# Patient Record
Sex: FEMALE | Race: WHITE | NOT HISPANIC OR LATINO | Employment: FULL TIME | URBAN - METROPOLITAN AREA
[De-identification: names, ages, dates, MRNs, and addresses within clinical notes are randomized per-mention and may not be internally consistent; named-entity substitution may affect disease eponyms.]

---

## 2019-12-08 ENCOUNTER — OFFICE VISIT (OUTPATIENT)
Dept: URGENT CARE | Facility: MEDICAL CENTER | Age: 49
End: 2019-12-08
Payer: COMMERCIAL

## 2019-12-08 VITALS
WEIGHT: 127 LBS | DIASTOLIC BLOOD PRESSURE: 78 MMHG | OXYGEN SATURATION: 100 % | SYSTOLIC BLOOD PRESSURE: 110 MMHG | BODY MASS INDEX: 22.5 KG/M2 | TEMPERATURE: 98.1 F | HEART RATE: 48 BPM | RESPIRATION RATE: 18 BRPM | HEIGHT: 63 IN

## 2019-12-08 DIAGNOSIS — R30.0 DYSURIA: Primary | ICD-10-CM

## 2019-12-08 LAB
SL AMB  POCT GLUCOSE, UA: NORMAL
SL AMB LEUKOCYTE ESTERASE,UA: NORMAL
SL AMB POCT BILIRUBIN,UA: NORMAL
SL AMB POCT BLOOD,UA: NORMAL
SL AMB POCT CLARITY,UA: CLEAR
SL AMB POCT COLOR,UA: YELLOW
SL AMB POCT KETONES,UA: NORMAL
SL AMB POCT NITRITE,UA: NORMAL
SL AMB POCT PH,UA: 6.5
SL AMB POCT SPECIFIC GRAVITY,UA: 1.01
SL AMB POCT URINE PROTEIN: NORMAL
SL AMB POCT UROBILINOGEN: 0.2

## 2019-12-08 PROCEDURE — 81002 URINALYSIS NONAUTO W/O SCOPE: CPT | Performed by: PHYSICIAN ASSISTANT

## 2019-12-08 PROCEDURE — 87077 CULTURE AEROBIC IDENTIFY: CPT | Performed by: PHYSICIAN ASSISTANT

## 2019-12-08 PROCEDURE — 87086 URINE CULTURE/COLONY COUNT: CPT | Performed by: PHYSICIAN ASSISTANT

## 2019-12-08 PROCEDURE — 99203 OFFICE O/P NEW LOW 30 MIN: CPT | Performed by: PHYSICIAN ASSISTANT

## 2019-12-08 RX ORDER — NITROFURANTOIN 25; 75 MG/1; MG/1
100 CAPSULE ORAL 2 TIMES DAILY
Qty: 14 CAPSULE | Refills: 0 | Status: SHIPPED | OUTPATIENT
Start: 2019-12-08 | End: 2019-12-15

## 2019-12-09 NOTE — PROGRESS NOTES
[facility]        NAME: Kiara Mustafa is a 51 y o  female  : 1970    MRN: [id number]  DATE: 2019  TIME: 7:56 PM    Assessment and Plan   Dysuria [R30 0]  1  Dysuria  POCT urine dip    Urine culture    nitrofurantoin (MACROBID) 100 mg capsule         Patient Instructions     1  Take Macrobid 100 mg twice daily for 7 days  2  Increase fluids  3  Motrin as needed for discomfort  4  Follow up with PCP in 3-5 days if symptoms persist       Chief Complaint     Chief Complaint   Patient presents with    Possible UTI     Dysuria and abdominal pressure beginning at 3:00pm today  History of Present Illness       Martha Adams is a 27-year-old female presents with a 1 day history of acute onset pain and burning with urination  Patient denies any abdominal or back pain, she has noticed no visible blood in her urine  Review of Systems   Review of Systems   Constitutional: Negative  Gastrointestinal: Negative  Genitourinary: Positive for dysuria, frequency and urgency  Negative for hematuria  Current Medications       Current Outpatient Medications:     nitrofurantoin (MACROBID) 100 mg capsule, Take 1 capsule (100 mg total) by mouth 2 (two) times a day for 7 days, Disp: 14 capsule, Rfl: 0    Current Allergies     Allergies as of 2019    (No Known Allergies)            The following portions of the patient's history were reviewed and updated as appropriate: allergies, current medications, past family history, past medical history, past social history, past surgical history and problem list      History reviewed  No pertinent past medical history  Past Surgical History:   Procedure Laterality Date    CHOLECYSTECTOMY      GASTRIC BYPASS         Family History   Problem Relation Age of Onset    Heart disease Mother     COPD Mother          Medications have been verified          Objective   /78   Pulse (!) 48   Temp 98 1 °F (36 7 °C) (Temporal)   Resp 18 Ht 5' 3" (1 6 m)   Wt 57 6 kg (127 lb)   SpO2 100%   BMI 22 50 kg/m²        Physical Exam     Physical Exam   Constitutional: She appears well-developed and well-nourished  No distress  Cardiovascular: Normal rate, regular rhythm and normal heart sounds  No murmur heard  Pulmonary/Chest: Effort normal and breath sounds normal    Abdominal: Soft  Normal appearance and bowel sounds are normal  There is tenderness in the suprapubic area  There is no CVA tenderness

## 2019-12-09 NOTE — PATIENT INSTRUCTIONS
1  Take Macrobid 100 mg twice daily for 7 days  2  Increase fluids  3  Motrin as needed for discomfort  4   Follow up with PCP in 3-5 days if symptoms persist

## 2019-12-10 LAB — BACTERIA UR CULT: ABNORMAL

## 2020-12-01 LAB
EXTERNAL HIV CONFIRMATION: NORMAL
EXTERNAL HIV SCREEN: NORMAL

## 2021-03-26 DIAGNOSIS — Z23 ENCOUNTER FOR IMMUNIZATION: ICD-10-CM

## 2022-02-02 ENCOUNTER — OFFICE VISIT (OUTPATIENT)
Dept: FAMILY MEDICINE CLINIC | Facility: CLINIC | Age: 52
End: 2022-02-02
Payer: COMMERCIAL

## 2022-02-02 VITALS
HEIGHT: 64 IN | DIASTOLIC BLOOD PRESSURE: 82 MMHG | WEIGHT: 141 LBS | SYSTOLIC BLOOD PRESSURE: 120 MMHG | BODY MASS INDEX: 24.07 KG/M2 | TEMPERATURE: 98.2 F | HEART RATE: 69 BPM | OXYGEN SATURATION: 99 % | RESPIRATION RATE: 16 BRPM

## 2022-02-02 DIAGNOSIS — Z90.3 S/P PARTIAL GASTRECTOMY: ICD-10-CM

## 2022-02-02 DIAGNOSIS — F41.9 ANXIETY: ICD-10-CM

## 2022-02-02 DIAGNOSIS — K60.2 ANAL FISSURE: ICD-10-CM

## 2022-02-02 DIAGNOSIS — F50.81 BINGE EATING DISORDER: Primary | ICD-10-CM

## 2022-02-02 DIAGNOSIS — Z23 NEED FOR VACCINATION: ICD-10-CM

## 2022-02-02 DIAGNOSIS — Z12.11 SCREENING FOR COLON CANCER: ICD-10-CM

## 2022-02-02 PROBLEM — D25.0 SUBMUCOUS LEIOMYOMA OF UTERUS: Status: ACTIVE | Noted: 2018-10-29

## 2022-02-02 PROBLEM — Z98.84 S/P BARIATRIC SURGERY: Status: ACTIVE | Noted: 2022-02-02

## 2022-02-02 PROBLEM — Z87.410 HISTORY OF CERVICAL DYSPLASIA: Status: ACTIVE | Noted: 2021-05-24

## 2022-02-02 PROCEDURE — 90715 TDAP VACCINE 7 YRS/> IM: CPT

## 2022-02-02 PROCEDURE — 90471 IMMUNIZATION ADMIN: CPT

## 2022-02-02 PROCEDURE — 3008F BODY MASS INDEX DOCD: CPT | Performed by: NURSE PRACTITIONER

## 2022-02-02 PROCEDURE — 99204 OFFICE O/P NEW MOD 45 MIN: CPT | Performed by: FAMILY MEDICINE

## 2022-02-02 RX ORDER — DIPHENOXYLATE HYDROCHLORIDE AND ATROPINE SULFATE 2.5; .025 MG/1; MG/1
1 TABLET ORAL DAILY
COMMUNITY

## 2022-02-02 RX ORDER — BUPROPION HYDROCHLORIDE 300 MG/1
300 TABLET ORAL DAILY
COMMUNITY
End: 2022-05-11 | Stop reason: SDUPTHER

## 2022-02-02 RX ORDER — LORAZEPAM 1 MG/1
1 TABLET ORAL EVERY 8 HOURS PRN
COMMUNITY
End: 2022-02-17 | Stop reason: SDUPTHER

## 2022-02-02 RX ORDER — MULTIVITAMIN WITH IRON
8000 TABLET ORAL DAILY
COMMUNITY

## 2022-02-03 ENCOUNTER — TELEPHONE (OUTPATIENT)
Dept: ADMINISTRATIVE | Facility: OTHER | Age: 52
End: 2022-02-03

## 2022-02-03 NOTE — TELEPHONE ENCOUNTER
Upon review of the In Basket request we were able to locate, review, and update the patient chart as requested for HIV, Mammogram and Pap Smear (HPV) aka Cervical Cancer Screening  Any additional questions or concerns should be emailed to the Practice Liaisons via [email or web address] email, please do not reply via In Basket      Thank you  Yamini Lipscomb MA

## 2022-02-03 NOTE — TELEPHONE ENCOUNTER
Upon review of the In Basket request we were able to locate, review, and update the patient chart as requested for Pap Smear (HPV) aka Cervical Cancer Screening  Any additional questions or concerns should be emailed to the Practice Liaisons via [email or web address] email, please do not reply via In Basket      Thank you  Sammy Bolivar MA

## 2022-02-03 NOTE — TELEPHONE ENCOUNTER
Upon review of the In Basket request we were able to locate, review, and update the patient chart as requested for HIV  Any additional questions or concerns should be emailed to the Practice Liaisons via [email or web address] email, please do not reply via In Basket      Thank you  Deepti Rodriguez MA

## 2022-02-03 NOTE — TELEPHONE ENCOUNTER
----- Message from Marylee Shuck, MD sent at 2/2/2022  6:37 PM EST -----  02/02/22 6:37 PM    Hello, our patient Travis Singer has had Pap Smear (HPV) aka Cervical Cancer Screening completed/performed  Please assist in updating the patient chart by pulling the Care Everywhere (CE) document  The date of service is 5/24/21       Thank you,  Marylee Shuck, MD  [facility]

## 2022-02-03 NOTE — TELEPHONE ENCOUNTER
----- Message from Agapito Rodriguez MD sent at 2/2/2022  6:37 PM EST -----  02/02/22 6:37 PM    Hello, our patient Leland Jensen has had Mammogram completed/performed  Please assist in updating the patient chart by pulling the Care Everywhere (CE) document  The date of service is 1/18/22       Thank you,  Agapito Rodriguez MD  [facility]

## 2022-02-03 NOTE — PROGRESS NOTES
Assessment/Plan:       Diagnoses and all orders for this visit:    1  Binge eating disorder  Comments:  discussed therapy vs medication management  she would like to hold off of medication at this time  Orders:  -     Ambulatory Referral to [facility]; Future    2  S/P partial gastrectomy  Comments:  4 years ago    3  Anxiety  Comments:  has been on lexapro in the past which didn't help  states wellbutrin is not working either  takes ativan prn  Orders:  -     Ambulatory Referral to [facility]; Future    4  Screening for colon cancer  -     Ambulatory referral to Gastroenterology; Future    5  Need for vaccination  -     TDAP VACCINE GREATER THAN OR EQUAL TO 8YO IM    6  Anal fissure  Comments:  recommend OTC rectal hydrocortisone, topical lidocaine  stressed importance of hydration  continue stool softener  Subjective:      Patient ID: Yaniv Foster is a 49 y o  female  HPI   Bethany Titus is a 50 yo female with history of anxiety, binge eating disorder, s/p partial gastrectomy 4 years ago, anal fissure who presents today as a new patient to establish care  States that her anal fissures has been bothering her lately  Has tried OTC anusol  Does not drink enough water  Has history of binge eating disorder  In the past was on lexapro and strattera which did not help  Was then put on wellbutrin which she is on now, but states this does not help either  Would like to wean off of it  The following portions of the patient's history were reviewed and updated as appropriate: allergies, current medications, past family history, past medical history, past social history, past surgical history and problem list     Review of Systems   Constitutional: Negative  HENT: Negative  Eyes: Negative  Respiratory: Negative  Cardiovascular: Negative  Gastrointestinal: Negative  Endocrine: Negative  Genitourinary: Negative  Musculoskeletal: Negative  Skin: Negative  Allergic/Immunologic: Negative  Neurological: Negative  Hematological: Negative  Psychiatric/Behavioral: Negative  Objective:      /82   Pulse 69   Temp 98 2 °F (36 8 °C)   Resp 16   Ht 5' 3 75" (1 619 m)   Wt 64 kg (141 lb)   SpO2 99%   BMI 24 39 kg/m²          Physical Exam  Constitutional:       General: She is not in acute distress  Appearance: She is well-developed  She is not diaphoretic  HENT:      Head: Normocephalic and atraumatic  Cardiovascular:      Rate and Rhythm: Normal rate and regular rhythm  Heart sounds: Normal heart sounds  No murmur heard  No friction rub  No gallop  Pulmonary:      Effort: Pulmonary effort is normal  No respiratory distress  Breath sounds: Normal breath sounds  No wheezing or rales  Chest:      Chest wall: No tenderness  Musculoskeletal:         General: No deformity  Normal range of motion  Cervical back: Normal range of motion and neck supple  Skin:     General: Skin is warm and dry  Neurological:      Mental Status: She is alert and oriented to person, place, and time  Psychiatric:         Behavior: Behavior normal          Thought Content:  Thought content normal          Judgment: Judgment normal

## 2022-02-03 NOTE — TELEPHONE ENCOUNTER
Upon review of the In Basket request we were able to locate, review, and update the patient chart as requested for Mammogram     Any additional questions or concerns should be emailed to the Practice Liaisons via [email or web address] email, please do not reply via In Basket      Thank you  Maine Robertson MA

## 2022-02-16 ENCOUNTER — OFFICE VISIT (OUTPATIENT)
Dept: OBGYN CLINIC | Facility: CLINIC | Age: 52
End: 2022-02-16
Payer: COMMERCIAL

## 2022-02-16 VITALS — SYSTOLIC BLOOD PRESSURE: 102 MMHG | DIASTOLIC BLOOD PRESSURE: 68 MMHG | BODY MASS INDEX: 23.7 KG/M2 | WEIGHT: 137 LBS

## 2022-02-16 DIAGNOSIS — N90.4 LICHEN SCLEROSUS OF VULVA: Primary | ICD-10-CM

## 2022-02-16 PROCEDURE — 1036F TOBACCO NON-USER: CPT | Performed by: NURSE PRACTITIONER

## 2022-02-16 PROCEDURE — 99202 OFFICE O/P NEW SF 15 MIN: CPT | Performed by: NURSE PRACTITIONER

## 2022-02-16 RX ORDER — CLOBETASOL PROPIONATE 0.5 MG/G
CREAM TOPICAL
Qty: 60 G | Refills: 3 | Status: SHIPPED | OUTPATIENT
Start: 2022-02-16 | End: 2023-02-15

## 2022-02-16 NOTE — PROGRESS NOTES
Assessment/Plan:    Lichen sclerosus of vulva  Reviewed lichen sclerosus in detail with patient, she is very familiar from her mother  Her mother has Lichen and she cares for her mother  Information given on Lichen  Rx for Temovate BID x 2 weeks then 1-3 x weekly PRN  If symptoms worsen or fail to improve with treatment recommend return to office for punch biopsy  RTO annual exam or sooner as needed       Diagnoses and all orders for this visit:    Lichen sclerosus of vulva  -     clobetasol (TEMOVATE) 0 05 % cream; Apply 5 g (1 application total) topically 2 (two) times a day for 14 days, THEN 5 g (1 application total) 3 (three) times a week  Subjective:      Patient ID: Pete Kirkpatrick is a 49 y o  female  Pt presents as a new partient for a problem visit  Presents with complaints of vaginal itching x 3-4 days  On outer portion of right labia manora, looked raw yesterday, but better today  She used her mother's cream (Temovate), her mother has lichen sclerosus and she was thinking it may be that  She also was informed by previous GYN is loss of pigmentation in her vulvar and vaginal area  She also has issues with anal fissure that is not healing appropriate  When she has intercourse she feels like there are little cuts or sores on anterior portion of vagina  Noticed it tends to occur with stress  At her previous GYN she was tested for HSV after presenting with similar symptoms  At that time she did have lesions as well  Was informed it was not HSV after culture and lab work  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  Anal itching she originally thought was a hemorrhoid, but has not resolved and no visual hemorrhoid seen  Seems better since using her mother's steroid cream    Denies any burning  Denies any abnormal dicharge, itching, or odor vaginally  Denies any STD testing  Hx of Ablation    Last annual exam and pap smear June 2021            The following portions of the patient's history were reviewed and updated as appropriate: allergies, current medications, past family history, past medical history, past social history, past surgical history and problem list     Review of Systems   Genitourinary:        Vaginal itching     All other systems reviewed and are negative  Objective:      /68 (BP Location: Right arm, Patient Position: Sitting, Cuff Size: Standard)   Wt 62 1 kg (137 lb)   BMI 23 70 kg/m²          Physical Exam  Vitals and nursing note reviewed  Constitutional:       Appearance: She is well-developed  HENT:      Head: Normocephalic and atraumatic  Neck:      Thyroid: No thyromegaly  Cardiovascular:      Rate and Rhythm: Normal rate and regular rhythm  Heart sounds: Normal heart sounds  Pulmonary:      Effort: Pulmonary effort is normal       Breath sounds: Normal breath sounds  Abdominal:      General: Bowel sounds are normal  There is no distension  Palpations: Abdomen is soft  There is no mass  Tenderness: There is no abdominal tenderness  There is no guarding or rebound  Genitourinary:     Labia:         Right: No rash, tenderness, lesion or injury  Left: No rash, tenderness, lesion or injury  Vagina: Normal       Cervix: No cervical motion tenderness, discharge or friability  Adnexa:         Right: No mass, tenderness or fullness  Left: No mass, tenderness or fullness  Musculoskeletal:         General: Normal range of motion  Cervical back: Normal range of motion and neck supple  Skin:     General: Skin is warm and dry  Neurological:      Mental Status: She is alert and oriented to person, place, and time  Psychiatric:         Behavior: Behavior normal          Thought Content:  Thought content normal          Judgment: Judgment normal

## 2022-02-17 DIAGNOSIS — F41.9 ANXIETY: Primary | ICD-10-CM

## 2022-02-17 RX ORDER — LORAZEPAM 1 MG/1
1 TABLET ORAL EVERY 8 HOURS PRN
Qty: 30 TABLET | Refills: 0 | Status: SHIPPED | OUTPATIENT
Start: 2022-02-17 | End: 2022-05-11 | Stop reason: SDUPTHER

## 2022-05-02 ENCOUNTER — TELEPHONE (OUTPATIENT)
Dept: FAMILY MEDICINE CLINIC | Facility: CLINIC | Age: 52
End: 2022-05-02

## 2022-05-02 DIAGNOSIS — F41.9 ANXIETY: ICD-10-CM

## 2022-05-11 DIAGNOSIS — F41.9 ANXIETY: ICD-10-CM

## 2022-05-11 RX ORDER — LORAZEPAM 1 MG/1
1 TABLET ORAL EVERY 8 HOURS PRN
Qty: 30 TABLET | Refills: 0 | Status: SHIPPED | OUTPATIENT
Start: 2022-05-11 | End: 2022-08-03

## 2022-05-11 RX ORDER — BUPROPION HYDROCHLORIDE 300 MG/1
300 TABLET ORAL DAILY
Qty: 90 TABLET | Refills: 1 | Status: SHIPPED | OUTPATIENT
Start: 2022-05-11

## 2022-06-08 ENCOUNTER — PREP FOR PROCEDURE (OUTPATIENT)
Dept: GASTROENTEROLOGY | Facility: CLINIC | Age: 52
End: 2022-06-08

## 2022-06-08 ENCOUNTER — TELEPHONE (OUTPATIENT)
Dept: GASTROENTEROLOGY | Facility: CLINIC | Age: 52
End: 2022-06-08

## 2022-06-08 DIAGNOSIS — Z12.11 SCREENING FOR COLON CANCER: Primary | ICD-10-CM

## 2022-06-08 NOTE — TELEPHONE ENCOUNTER
[facility] Assessment    Name: Fco Galdamez  YOB: 1970  Last Height: 5' 3 75" (1 619 m)  Last weight: 62 1 kg (137 lb)  BMI: 23 70 kg/m²  Procedure Colonoscopy  Diagnosis: Screening  Date of procedure: 6/27/22  Prep: Alan/Dul/Mag  Responsible : yes  Phone#: ?  Name completing form: Jaylene Portillo  Date form completed: 06/08/22      If the patient answers yes to any of these questions, schedule in a hospital  Are you pregnant: No  Do you rely on a wheelchair for mobility: No  Have you been diagnosed with End Stage Renal Disease (ESRD): No  Do you need oxygen during the day: No  Have you had a heart attack or stroke within the past three months: No  Have you had a seizure within the past three months: No  Have you ever been informed by anesthesia that you have a difficult airway: No  Additional Questions  Have you had any cardiac testing or are under the care of a Cardiologist (see cardiac list): No  Cardiac list:   Do you have an implanted cardiac defibrillator: No (Comment:  This patient should be scheduled in the hospital)    Have any bleeding problems, such as anemia or hemophilia (If patient has H&H result below 8, schedule in hospital   H&H must be within 30 days of procedure): No    Had an organ transplant within the past 3 months: No    Do you have any present infections: No  Do you get short of breath when walking a few blocks: No  Have you been diagnosed with diabetes: No  Comments (provide cardiac provider information if applicable):

## 2022-06-08 NOTE — TELEPHONE ENCOUNTER
Scheduled date of colonoscopy (as of today):6/27/22  Physician performing colonoscopy:Ro    Location of colonoscopy: [facility]    Bowel prep reviewed with patient:Alan/Nayely    Instructions reviewed with patient by:CHASTITY    Clearances: NA

## 2022-06-13 ENCOUNTER — ANNUAL EXAM (OUTPATIENT)
Dept: OBGYN CLINIC | Facility: CLINIC | Age: 52
End: 2022-06-13
Payer: COMMERCIAL

## 2022-06-13 VITALS — BODY MASS INDEX: 24.05 KG/M2 | SYSTOLIC BLOOD PRESSURE: 102 MMHG | DIASTOLIC BLOOD PRESSURE: 70 MMHG | WEIGHT: 139 LBS

## 2022-06-13 DIAGNOSIS — Z12.31 BREAST CANCER SCREENING BY MAMMOGRAM: ICD-10-CM

## 2022-06-13 DIAGNOSIS — Z01.419 ENCNTR FOR GYN EXAM (GENERAL) (ROUTINE) W/O ABN FINDINGS: Primary | ICD-10-CM

## 2022-06-13 PROCEDURE — 99396 PREV VISIT EST AGE 40-64: CPT | Performed by: NURSE PRACTITIONER

## 2022-06-13 PROCEDURE — 1036F TOBACCO NON-USER: CPT | Performed by: NURSE PRACTITIONER

## 2022-06-13 NOTE — PROGRESS NOTES
Assessment/Plan   Diagnoses and all orders for this visit:    Encntr for gyn exam (general) (routine) w/o abn findings  -     IGP, Aptima HPV    Breast cancer screening by mammogram  -     Mammo screening bilateral w 3d & cad; Future        Discussion    Reviewed normal exam today  Pap with HPV done today  Normal breast exam today  Monthly SBEs advised  Mammograms yearly  Rx for mammogram given  Encourage at least 1200 mg calcium citrate + 2000 IUs vitamin D3 divided through diet and supplement throughout the day  Encourage 30-40 min weight bearing exercise most days of week  Colon cancer screening with a colonoscopy is recommended starting at age 39 and reviewed benefits  I have reviewed the patient's risk factors for osteoporosis and ordered a dexa scan  Pt to check with insurance for coverage  All questions have been answered to her satisfaction  RTO for annual or sooner if needed    Subjective     Shalonda Gibbs is a 49 y o  female who presents for annual well woman exam    Last exam 5/24/2021 Pap unsatisfactory HPV non 16/18 + HPV 16 - HPV 18 - 6/15/2021 Pap normal   Pap guidelines reviewed with patient  Pt would like Pap today  Pt denies any abnormal vaginal discharge, itching, or odor  Pt in a mutually exclusive relationship () with a male partner and denies the need for STD testing today  Has been using temovate for Lichen, noticed a great improvement  Has noticed flares up when stressed, but cream helps significantly  Menstrual Cycle:  LMP: Hx of endometrial ablation 6 years ago  No menses since ablation  Some hot flashes, manageable  No hx of HRT  OB History     G 3 P 0  Contraception:   Practices monthly SBEs, no breast complaints today  Last Mammogram 1/18/2022  Colonoscopy Scheduled for next month  Denies any bowel or bladder issues  Pt follows with PCP for regular check-ups and blood work  Review of Systems   Genitourinary: Negative          The following portions of the patient's history were reviewed and updated as appropriate: allergies, current medications, past family history, past medical history, past social history, past surgical history and problem list     Past Medical History:   Diagnosis Date    Abnormal Pap smear of cervix     Anxiety     Binge eating disorder     on wellbutrin       Past Surgical History:   Procedure Laterality Date    CERVICAL BIOPSY  W/ LOOP ELECTRODE EXCISION      Age 26    CHOLECYSTECTOMY      GASTRIC BYPASS         Family History   Problem Relation Age of Onset    Heart disease Mother     COPD Mother     Other Mother        Social History     Socioeconomic History    Marital status: Single     Spouse name: Not on file    Number of children: Not on file    Years of education: Not on file    Highest education level: Not on file   Occupational History    Not on file   Tobacco Use    Smoking status: Never Smoker    Smokeless tobacco: Never Used   Vaping Use    Vaping Use: Never used   Substance and Sexual Activity    Alcohol use: Not Currently    Drug use: Never    Sexual activity: Yes     Partners: Male   Other Topics Concern    Not on file   Social History Narrative    Not on file     Social Determinants of Health     Financial Resource Strain: Not on file   Food Insecurity: Not on file   Transportation Needs: Not on file   Physical Activity: Not on file   Stress: Not on file   Social Connections: Not on file   Intimate Partner Violence: Not on file   Housing Stability: Not on file         Current Outpatient Medications:     B Complex Vitamins (VITAMIN B COMPLEX PO), Take by mouth every other day  , Disp: , Rfl:     buPROPion (WELLBUTRIN XL) 300 mg 24 hr tablet, Take 1 tablet (300 mg total) by mouth in the morning , Disp: 90 tablet, Rfl: 1    LORazepam (ATIVAN) 1 mg tablet, Take 1 tablet (1 mg total) by mouth every 8 (eight) hours as needed for anxiety, Disp: 30 tablet, Rfl: 0    multivitamin (THERAGRAN) TABS, Take 1 tablet by mouth daily, Disp: , Rfl:     vitamin A 2400 MCG (8000 UT) capsule, Take 8,000 Units by mouth daily, Disp: , Rfl:     clobetasol (TEMOVATE) 0 05 % cream, Apply 5 g (1 application total) topically 2 (two) times a day for 14 days, THEN 5 g (1 application total) 3 (three) times a week , Disp: 60 g, Rfl: 3    No Known Allergies    Objective   Vitals:    06/13/22 0903   BP: 102/70   BP Location: Right arm   Patient Position: Sitting   Cuff Size: Standard   Weight: 63 kg (139 lb)     Physical Exam  Vitals and nursing note reviewed  Constitutional:       Appearance: She is well-developed  HENT:      Head: Normocephalic  Neck:      Thyroid: No thyromegaly  Trachea: No tracheal deviation  Cardiovascular:      Rate and Rhythm: Normal rate and regular rhythm  Heart sounds: Normal heart sounds  Pulmonary:      Effort: Pulmonary effort is normal       Breath sounds: Normal breath sounds  Chest:   Breasts: Breasts are symmetrical       Right: No inverted nipple, mass, nipple discharge, skin change or tenderness  Left: No inverted nipple, mass, nipple discharge, skin change or tenderness  Abdominal:      General: Bowel sounds are normal  There is no distension  Palpations: Abdomen is soft  There is no mass  Tenderness: There is no abdominal tenderness  There is no guarding or rebound  Genitourinary:     Labia:         Right: No rash, tenderness, lesion or injury  Left: No rash, tenderness, lesion or injury  Vagina: Normal       Cervix: Normal       Uterus: Normal        Adnexa: Right adnexa normal and left adnexa normal         Right: No mass, tenderness or fullness  Left: No mass, tenderness or fullness  Musculoskeletal:         General: Normal range of motion  Cervical back: Normal range of motion and neck supple  Skin:     General: Skin is warm and dry  Neurological:      Mental Status: She is alert and oriented to person, place, and time  Psychiatric:         Behavior: Behavior normal          Thought Content:  Thought content normal          Judgment: Judgment normal

## 2022-06-16 LAB
CYTOLOGIST CVX/VAG CYTO: ABNORMAL
DX ICD CODE: ABNORMAL
HPV I/H RISK 4 DNA CVX QL PROBE+SIG AMP: POSITIVE
OTHER STN SPEC: ABNORMAL
PATH REPORT.FINAL DX SPEC: ABNORMAL
SL AMB NOTE:: ABNORMAL
SL AMB SPECIMEN ADEQUACY: ABNORMAL
SL AMB TEST METHODOLOGY: ABNORMAL

## 2022-06-20 ENCOUNTER — TELEPHONE (OUTPATIENT)
Dept: GASTROENTEROLOGY | Facility: CLINIC | Age: 52
End: 2022-06-20

## 2022-06-20 NOTE — TELEPHONE ENCOUNTER
I lmom confirming pt's colonoscopy scheduled on 6/7/22 with Dr Christel Bal at [facility]  I informed pt that [facility] would be calling 1 day prior with the arrival time  I informed pt that she needs to do a clear liquid diet the day prior as well as the bowel cleansing preparation  I informed pt that she will need a  the day of the procedure  I asked pt to please call back if she has not received her instructions or if she has any questions  I asked pt to please contact her insurance company if she has questions regarding coverage of her procedure

## 2022-07-17 ENCOUNTER — NURSE TRIAGE (OUTPATIENT)
Dept: OTHER | Facility: OTHER | Age: 52
End: 2022-07-17

## 2022-07-17 NOTE — TELEPHONE ENCOUNTER
Provided patient with home care advice for now; patient having mild symptoms with no chest discomfort or SOB  Patient declined virtual appointment with PCP; stated she would continue with home care advice and call back if anything worsened  Went over ED precautions with patient and to follow up tomorrow

## 2022-07-17 NOTE — TELEPHONE ENCOUNTER
Regarding: [facility] Covid + fever,nasal congestion  ----- Message from [place] sent at 7/17/2022  4:17 PM EDT -----  I tested positive for Covid last night  Now I am having a fever and a stuffy nose

## 2022-07-27 ENCOUNTER — TELEPHONE (OUTPATIENT)
Dept: GASTROENTEROLOGY | Facility: CLINIC | Age: 52
End: 2022-07-27

## 2022-07-27 NOTE — TELEPHONE ENCOUNTER
Received message from Roly Murphy at [facility] whom informed pt was positive for covid on 7/15/22 and needs to be r/sd to after 8/12/22  Dr Aakash Uriostegui does have an opening on 8/17/22 at [facility]  I lmom for pt to please call back to r/s her procedure on 8/2/22 as needs to be after 4 weeks from positive covid date  Will call pt again in a few days if do not hear back from her

## 2022-07-27 NOTE — TELEPHONE ENCOUNTER
Called and spoke to pt confirming her colonoscopy scheduled on 8/2/22 with Dr Alfreda Carrillo at [place] [address]  Informed [place] [id number] would be calling day prior with arrival time  Informed pt to do clear liquid diet day prior will need to do a bowel cleansing prep  Informed would need a  day of the procedure  Pt did not have any questions

## 2022-08-03 DIAGNOSIS — F41.9 ANXIETY: ICD-10-CM

## 2022-08-03 RX ORDER — LORAZEPAM 1 MG/1
TABLET ORAL
Qty: 30 TABLET | Refills: 0 | Status: SHIPPED | OUTPATIENT
Start: 2022-08-03

## 2022-08-09 ENCOUNTER — OFFICE VISIT (OUTPATIENT)
Dept: FAMILY MEDICINE CLINIC | Facility: CLINIC | Age: 52
End: 2022-08-09
Payer: COMMERCIAL

## 2022-08-09 VITALS
WEIGHT: 136 LBS | TEMPERATURE: 98.5 F | HEIGHT: 64 IN | DIASTOLIC BLOOD PRESSURE: 82 MMHG | BODY MASS INDEX: 23.22 KG/M2 | SYSTOLIC BLOOD PRESSURE: 106 MMHG | RESPIRATION RATE: 16 BRPM | HEART RATE: 70 BPM

## 2022-08-09 DIAGNOSIS — Z13.89 SCREENING FOR CARDIOVASCULAR, RESPIRATORY, AND GENITOURINARY DISEASES: ICD-10-CM

## 2022-08-09 DIAGNOSIS — Z13.29 SCREENING FOR THYROID DISORDER: ICD-10-CM

## 2022-08-09 DIAGNOSIS — Z00.00 WELL ADULT EXAM: Primary | ICD-10-CM

## 2022-08-09 DIAGNOSIS — M54.50 ACUTE BILATERAL LOW BACK PAIN WITHOUT SCIATICA: ICD-10-CM

## 2022-08-09 DIAGNOSIS — Z90.3 S/P PARTIAL GASTRECTOMY: ICD-10-CM

## 2022-08-09 DIAGNOSIS — Z11.59 NEED FOR HEPATITIS C SCREENING TEST: ICD-10-CM

## 2022-08-09 DIAGNOSIS — Z13.83 SCREENING FOR CARDIOVASCULAR, RESPIRATORY, AND GENITOURINARY DISEASES: ICD-10-CM

## 2022-08-09 DIAGNOSIS — Z13.6 SCREENING FOR CARDIOVASCULAR, RESPIRATORY, AND GENITOURINARY DISEASES: ICD-10-CM

## 2022-08-09 DIAGNOSIS — J34.89 SINUS PAIN: ICD-10-CM

## 2022-08-09 PROCEDURE — 99396 PREV VISIT EST AGE 40-64: CPT | Performed by: FAMILY MEDICINE

## 2022-08-09 RX ORDER — YEAST,DRIED (S. CEREVISIAE)
POWDER (GRAM) ORAL
COMMUNITY

## 2022-08-09 RX ORDER — METHYLPREDNISOLONE 4 MG/1
TABLET ORAL
Qty: 21 EACH | Refills: 0 | Status: SHIPPED | OUTPATIENT
Start: 2022-08-09 | End: 2022-09-19 | Stop reason: ALTCHOICE

## 2022-08-09 NOTE — PROGRESS NOTES
Assessment/Plan:       [facility]      Subjective:      Patient ID: Marissa Hernández is a 49 y o  female  Back Pain  This is a new problem  Episode onset: 3 weeks  {Common ambulatory SmartLinks:32031}    Review of Systems   Musculoskeletal: Positive for back pain           Objective:      /82   Pulse 70   Temp 98 5 °F (36 9 °C)   Resp 16   Ht 5' 3 75" (1 619 m)   Wt 61 7 kg (136 lb)   BMI 23 53 kg/m²          Physical Exam

## 2022-08-09 NOTE — PROGRESS NOTES
FAMILY PRACTICE HEALTH MAINTENANCE OFFICE VISIT  [facility] - [facility]    NAME: Karin Norman  AGE: 49 y o  SEX: female  : 1970     DATE: 2022    Assessment and Plan     1  Well adult exam    2  Acute bilateral low back pain without sciatica  -     methylPREDNISolone 4 MG tablet therapy pack; Use as directed on package    3  Need for hepatitis C screening test  -     Hepatitis C Antibody ([facility]); Future    4  S/P partial gastrectomy  -     Vitamin A; Future  -     HEMOGLOBIN A1C W/ EAG ESTIMATION; Future  -     Vitamin B12; Future  -     Vitamin B1, whole blood; Future  -     Vitamin B6; Future  -     Vitamin D 25 hydroxy; Future    5  Screening for cardiovascular, respiratory, and genitourinary diseases  -     CBC and differential; Future  -     Comprehensive metabolic panel; Future  -     Lipid Panel with Direct LDL reflex; Future    6  Screening for thyroid disorder  -     TSH, 3rd generation with Free T4 reflex; Future    7  Sinus pain  -     Ambulatory Referral to Otolaryngology; Future      Patient Counseling:   Nutrition: Stressed importance of a well balanced diet, moderation of sodium/saturated fat, caloric balance and sufficient intake of fiber  Exercise: Stressed the importance of regular exercise with a goal of 150 minutes per week  Dental Health: Discussed daily flossing and brushing and regular dental visits   Immunizations reviewed: Up To Date  Discussed benefits of:  Screening labs  BMI Counseling: Body mass index is 23 53 kg/m²  Discussed with patient's BMI with her  The BMI is normal      No follow-ups on file          Chief Complaint     Chief Complaint   Patient presents with    Back Pain     Lower, for 3 weeks                     [facility]         History of Present Illness     HPI    Well Adult Physical   Patient here for a comprehensive physical exam   She reports back pain for the past 3 weeks after sitting on a bad chair at a restaurant  Constant pain  Non radiating  6/10 intensity  Has had similar issues relieved with steroids  Diet and Physical Activity  Diet: well balanced diet  Exercise: daily      Depression Screen  PHQ-2/9 Depression Screening            General Health  Hearing: Normal:  bilateral  Vision: no vision problems, goes for regular eye exams and wears glasses  Dental: regular dental visits, brushes teeth twice daily and flosses teeth occasionally    Reproductive Health  No issues , Post-menopausal  and Follows with gynecologist      The following portions of the patient's history were reviewed and updated as appropriate: allergies, current medications, past family history, past medical history, past social history, past surgical history and problem list     Review of Systems     Review of Systems   Constitutional: Negative  HENT: Negative  Eyes: Negative  Respiratory: Negative  Cardiovascular: Negative  Gastrointestinal: Negative  Endocrine: Negative  Genitourinary: Negative  Musculoskeletal: Negative  Skin: Negative  Allergic/Immunologic: Negative  Neurological: Negative  Hematological: Negative  Psychiatric/Behavioral: Negative          Past Medical History     Past Medical History:   Diagnosis Date    Abnormal Pap smear of cervix     Anxiety     Binge eating disorder     on wellbutrin    COVID-19 07/15/2022       Past Surgical History     Past Surgical History:   Procedure Laterality Date    CERVICAL BIOPSY  W/ LOOP ELECTRODE EXCISION      Age 26    CHOLECYSTECTOMY      GASTRIC BYPASS         Social History     Social History     Socioeconomic History    Marital status: Single     Spouse name: None    Number of children: None    Years of education: None    Highest education level: None   Occupational History    None   Tobacco Use    Smoking status: Never Smoker    Smokeless tobacco: Never Used   Vaping Use    Vaping Use: Never used   Substance and Sexual Activity    Alcohol use:  Yes     Alcohol/week: 1 0 standard drink     Types: 1 Standard drinks or equivalent per week    Drug use: Never    Sexual activity: Yes     Partners: Male     Birth control/protection: Surgical     Comment: tubal ligation   Other Topics Concern    None   Social History Narrative    None     Social Determinants of Health     Financial Resource Strain: Not on file   Food Insecurity: Not on file   Transportation Needs: Not on file   Physical Activity: Not on file   Stress: Not on file   Social Connections: Not on file   Intimate Partner Violence: Not on file   Housing Stability: Not on file       Family History     Family History   Problem Relation Age of Onset    Heart disease Mother     COPD Mother     Other Mother        Current Medications       Current Outpatient Medications:     B Complex Vitamins (VITAMIN B COMPLEX PO), Take by mouth every other day  , Disp: , Rfl:     buPROPion (WELLBUTRIN XL) 300 mg 24 hr tablet, Take 1 tablet (300 mg total) by mouth in the morning , Disp: 90 tablet, Rfl: 1    clobetasol (TEMOVATE) 0 05 % cream, Apply 5 g (1 application total) topically 2 (two) times a day for 14 days, THEN 5 g (1 application total) 3 (three) times a week , Disp: 60 g, Rfl: 3    Collagen-Boron-Hyaluronic Acid (Move Free Force-A) 40-5-3 3 MG TABS, Take by mouth, Disp: , Rfl:     LORazepam (ATIVAN) 1 mg tablet, TAKE 1 TABLET BY MOUTH EVERY 8 HOURS AS NEEDED FOR ANXIETY, Disp: 30 tablet, Rfl: 0    methylPREDNISolone 4 MG tablet therapy pack, Use as directed on package, Disp: 21 each, Rfl: 0    multivitamin (THERAGRAN) TABS, Take 1 tablet by mouth daily, Disp: , Rfl:     vitamin A 2400 MCG (8000 UT) capsule, Take 8,000 Units by mouth daily, Disp: , Rfl:      Allergies     No Known Allergies    Objective     /82   Pulse 70   Temp 98 5 °F (36 9 °C)   Resp 16   Ht 5' 3 75" (1 619 m)   Wt 61 7 kg (136 lb)   BMI 23 53 kg/m²      Physical Exam  Constitutional:       General: She is not in acute distress  Appearance: Normal appearance  She is well-developed  She is not diaphoretic  HENT:      Head: Normocephalic and atraumatic  Right Ear: Tympanic membrane, ear canal and external ear normal  There is no impacted cerumen  Left Ear: Tympanic membrane, ear canal and external ear normal  There is no impacted cerumen  Eyes:      General: No scleral icterus  Right eye: No discharge  Left eye: No discharge  Extraocular Movements: Extraocular movements intact  Conjunctiva/sclera: Conjunctivae normal       Pupils: Pupils are equal, round, and reactive to light  Cardiovascular:      Rate and Rhythm: Normal rate and regular rhythm  Heart sounds: Normal heart sounds  No murmur heard  No friction rub  No gallop  Pulmonary:      Effort: Pulmonary effort is normal  No respiratory distress  Breath sounds: Normal breath sounds  No wheezing or rales  Chest:      Chest wall: No tenderness  Abdominal:      General: Bowel sounds are normal  There is no distension  Palpations: Abdomen is soft  There is no mass  Tenderness: There is no abdominal tenderness  There is no guarding or rebound  Musculoskeletal:         General: No deformity  Normal range of motion  Cervical back: Normal range of motion and neck supple  Skin:     General: Skin is warm and dry  Findings: No erythema or rash  Neurological:      Mental Status: She is alert and oriented to person, place, and time  Psychiatric:         Behavior: Behavior normal          Thought Content:  Thought content normal          Judgment: Judgment normal            No exam data present        Gasper Bernheim, MD  [address] [facility]

## 2022-08-10 ENCOUNTER — APPOINTMENT (OUTPATIENT)
Dept: LAB | Facility: HOSPITAL | Age: 52
End: 2022-08-10
Payer: COMMERCIAL

## 2022-08-10 DIAGNOSIS — Z90.3 S/P PARTIAL GASTRECTOMY: ICD-10-CM

## 2022-08-10 DIAGNOSIS — Z13.6 SCREENING FOR CARDIOVASCULAR, RESPIRATORY, AND GENITOURINARY DISEASES: ICD-10-CM

## 2022-08-10 DIAGNOSIS — Z13.89 SCREENING FOR CARDIOVASCULAR, RESPIRATORY, AND GENITOURINARY DISEASES: ICD-10-CM

## 2022-08-10 DIAGNOSIS — Z13.83 SCREENING FOR CARDIOVASCULAR, RESPIRATORY, AND GENITOURINARY DISEASES: ICD-10-CM

## 2022-08-10 DIAGNOSIS — Z11.59 NEED FOR HEPATITIS C SCREENING TEST: ICD-10-CM

## 2022-08-10 DIAGNOSIS — Z13.29 SCREENING FOR THYROID DISORDER: ICD-10-CM

## 2022-08-10 LAB
25(OH)D3 SERPL-MCNC: 48.6 NG/ML (ref 30–100)
ALBUMIN SERPL BCP-MCNC: 4.1 G/DL (ref 3.5–5)
ALP SERPL-CCNC: 96 U/L (ref 46–116)
ALT SERPL W P-5'-P-CCNC: 43 U/L (ref 12–78)
ANION GAP SERPL CALCULATED.3IONS-SCNC: 10 MMOL/L (ref 4–13)
AST SERPL W P-5'-P-CCNC: 27 U/L (ref 5–45)
BASOPHILS # BLD AUTO: 0.05 THOUSANDS/ÂΜL (ref 0–0.1)
BASOPHILS NFR BLD AUTO: 1 % (ref 0–1)
BILIRUB SERPL-MCNC: 0.5 MG/DL (ref 0.2–1)
BUN SERPL-MCNC: 10 MG/DL (ref 5–25)
CALCIUM SERPL-MCNC: 9.4 MG/DL (ref 8.3–10.1)
CHLORIDE SERPL-SCNC: 105 MMOL/L (ref 96–108)
CHOLEST SERPL-MCNC: 243 MG/DL
CO2 SERPL-SCNC: 28 MMOL/L (ref 21–32)
CREAT SERPL-MCNC: 0.72 MG/DL (ref 0.6–1.3)
EOSINOPHIL # BLD AUTO: 0.09 THOUSAND/ÂΜL (ref 0–0.61)
EOSINOPHIL NFR BLD AUTO: 1 % (ref 0–6)
ERYTHROCYTE [DISTWIDTH] IN BLOOD BY AUTOMATED COUNT: 12.9 % (ref 11.6–15.1)
EST. AVERAGE GLUCOSE BLD GHB EST-MCNC: 114 MG/DL
GFR SERPL CREATININE-BSD FRML MDRD: 97 ML/MIN/1.73SQ M
GLUCOSE P FAST SERPL-MCNC: 83 MG/DL (ref 65–99)
HBA1C MFR BLD: 5.6 %
HCT VFR BLD AUTO: 41.9 % (ref 34.8–46.1)
HCV AB SER QL: NORMAL
HDLC SERPL-MCNC: 73 MG/DL
HGB BLD-MCNC: 13.8 G/DL (ref 11.5–15.4)
IMM GRANULOCYTES # BLD AUTO: 0.03 THOUSAND/UL (ref 0–0.2)
IMM GRANULOCYTES NFR BLD AUTO: 0 % (ref 0–2)
LDLC SERPL CALC-MCNC: 162 MG/DL (ref 0–100)
LYMPHOCYTES # BLD AUTO: 1.83 THOUSANDS/ÂΜL (ref 0.6–4.47)
LYMPHOCYTES NFR BLD AUTO: 21 % (ref 14–44)
MCH RBC QN AUTO: 28.1 PG (ref 26.8–34.3)
MCHC RBC AUTO-ENTMCNC: 32.9 G/DL (ref 31.4–37.4)
MCV RBC AUTO: 85 FL (ref 82–98)
MONOCYTES # BLD AUTO: 0.53 THOUSAND/ÂΜL (ref 0.17–1.22)
MONOCYTES NFR BLD AUTO: 6 % (ref 4–12)
NEUTROPHILS # BLD AUTO: 6.17 THOUSANDS/ÂΜL (ref 1.85–7.62)
NEUTS SEG NFR BLD AUTO: 71 % (ref 43–75)
NRBC BLD AUTO-RTO: 0 /100 WBCS
PLATELET # BLD AUTO: 346 THOUSANDS/UL (ref 149–390)
PMV BLD AUTO: 10.3 FL (ref 8.9–12.7)
POTASSIUM SERPL-SCNC: 3.6 MMOL/L (ref 3.5–5.3)
PROT SERPL-MCNC: 7.7 G/DL (ref 6.4–8.4)
RBC # BLD AUTO: 4.91 MILLION/UL (ref 3.81–5.12)
SODIUM SERPL-SCNC: 143 MMOL/L (ref 135–147)
TRIGL SERPL-MCNC: 40 MG/DL
TSH SERPL DL<=0.05 MIU/L-ACNC: 0.92 UIU/ML (ref 0.45–4.5)
VIT B12 SERPL-MCNC: 786 PG/ML (ref 100–900)
WBC # BLD AUTO: 8.7 THOUSAND/UL (ref 4.31–10.16)

## 2022-08-10 PROCEDURE — 85025 COMPLETE CBC W/AUTO DIFF WBC: CPT

## 2022-08-10 PROCEDURE — 82607 VITAMIN B-12: CPT

## 2022-08-10 PROCEDURE — 36415 COLL VENOUS BLD VENIPUNCTURE: CPT

## 2022-08-10 PROCEDURE — 82306 VITAMIN D 25 HYDROXY: CPT

## 2022-08-10 PROCEDURE — 80053 COMPREHEN METABOLIC PANEL: CPT

## 2022-08-10 PROCEDURE — 84425 ASSAY OF VITAMIN B-1: CPT

## 2022-08-10 PROCEDURE — 86803 HEPATITIS C AB TEST: CPT

## 2022-08-10 PROCEDURE — 80061 LIPID PANEL: CPT

## 2022-08-10 PROCEDURE — 84207 ASSAY OF VITAMIN B-6: CPT

## 2022-08-10 PROCEDURE — 83036 HEMOGLOBIN GLYCOSYLATED A1C: CPT

## 2022-08-10 PROCEDURE — 84590 ASSAY OF VITAMIN A: CPT

## 2022-08-10 PROCEDURE — 84443 ASSAY THYROID STIM HORMONE: CPT

## 2022-08-13 LAB — VIT B1 BLD-SCNC: 249.4 NMOL/L (ref 66.5–200)

## 2022-08-14 LAB — VIT B6 SERPL-MCNC: 30.3 UG/L (ref 3.4–65.2)

## 2022-08-15 LAB — VIT A SERPL-MCNC: 50.8 UG/DL (ref 20.1–62)

## 2022-08-18 ENCOUNTER — TELEPHONE (OUTPATIENT)
Dept: FAMILY MEDICINE CLINIC | Facility: CLINIC | Age: 52
End: 2022-08-18

## 2022-08-18 DIAGNOSIS — M54.50 ACUTE BILATERAL LOW BACK PAIN WITHOUT SCIATICA: Primary | ICD-10-CM

## 2022-08-18 RX ORDER — NAPROXEN 500 MG/1
500 TABLET ORAL 2 TIMES DAILY WITH MEALS
Qty: 20 TABLET | Refills: 0 | Status: SHIPPED | OUTPATIENT
Start: 2022-08-18 | End: 2022-08-28

## 2022-08-18 RX ORDER — CYCLOBENZAPRINE HCL 10 MG
10 TABLET ORAL 3 TIMES DAILY PRN
Qty: 20 TABLET | Refills: 0 | Status: SHIPPED | OUTPATIENT
Start: 2022-08-18

## 2022-08-18 NOTE — TELEPHONE ENCOUNTER
Patient called and stated that she finished the round of steriods for her back pain and she is still in pain  What should she do now? PT / more medication? Please call patient and let her know      If medication is being sent she uses the [facility] pharmacy on [address] [facility] [facility]

## 2022-08-18 NOTE — TELEPHONE ENCOUNTER
I sent naproxen (anti inflammatory) and flexeril (muscle relaxer) to her pharmacy to try  Please let her know that the flexeril can make her drowsy so not to drive/operate heavy machinery while on it  She can take it at bedtime

## 2022-08-29 ENCOUNTER — EVALUATION (OUTPATIENT)
Dept: PHYSICAL THERAPY | Facility: CLINIC | Age: 52
End: 2022-08-29
Payer: COMMERCIAL

## 2022-08-29 DIAGNOSIS — M54.50 ACUTE BILATERAL LOW BACK PAIN WITHOUT SCIATICA: ICD-10-CM

## 2022-08-29 DIAGNOSIS — M54.50 LUMBAR SPINE PAIN: Primary | ICD-10-CM

## 2022-08-29 PROCEDURE — 97161 PT EVAL LOW COMPLEX 20 MIN: CPT

## 2022-08-29 NOTE — PROGRESS NOTES
PT Evaluation     Today's date: 2022  Patient name: Kaya Guido  : 1970  MRN: [id number]  Referring provider: Voncile Closs, MD  Dx:   Encounter Diagnosis     ICD-10-CM    1  Lumbar spine pain  M54 50    2  Acute bilateral low back pain without sciatica  M54 50 Ambulatory Referral to Physical Therapy         Assessment  Assessment details: Patient is a 49 y o  Female who presents to skilled outpatient PT with referring diagnosis of acute bilateral low back pain without sciatica  Patient presents to clinic with poor posture, lower back pain, and deficits in lower extremity strength  The aforementioned impairments have limited the patient's ability to stand for periods of time, sit for periods of time, work without pain, conduct activities of daily living without pain to include exercising for health  Patient education performed during today's session included: HEP as noted on flow sheet, nature of lumbar radiculopathy, and postural education  Patient verbalized understanding of POC      Impairments: Abnormal or restricted ROM, Activity intolerance, Impaired balance, Impaired physical strength, Lacks appropriate HEP, Poor posture, Poor body mechanics and Pain with function  Understanding of Dx/Px/POC: Excellent  Prognosis: Good      Plan  Patient would benefit from: Skilled PT  Planned modality interventions: Biofeedback, Cryotherapy, TENS and Thermotherapy: Hydrocollator Packs  Planned therapy interventions: Abdominal trunk stabilization, Balance, Balance/WB training, Breathing training, Body mechanics training, Coordination, Functional ROM exercises and Gait training, strength training, stretching, traction, and taping  Frequency: 2x/wk  Duration in weeks: 8  Plan of Care beginning date: 22  Plan of Care expiration date: 8 weeks - 10/24/2022  Treatment plan discussed with: Patient       Goals  Short Term Goals (4 weeks):    - Patient will be independent in basic HEP 2-3 weeks  - Patient will have 0/10 pain at rest  - Patient will demonstrate >1/3 improvement in MMT grade as applicable  - Patient will be able to stand for an hour without an increase in pain sxs    Long Term Goals (8 weeks):  - Patient will be independent in a comprehensive home exercise program  - Patient FOTO score will improve by 10 points  - Patient will self-report >75% improvement in function  - Patient functional goal will be able to sit for two hours in a hard chair without triggering her back sxs      Subjective    History of Present Illness  - Mechanism of injury: Patient sits in a restaurant in a hard chair which will start the pain and usually the pain goes away in a week  Patient finds that sitting in a hard chair for a period of time is often the trigger  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  Hx of weight loss surgery and gall bladder surgery  Patient reports pain is worse in the morning and better in the afternoon  Patient mentioned that a MD had told her at one point her legs were uneven and she should place a lift in one shoe      - Functional limitations: Bending over, picking things up, exercising the way she wants to, getting out of her chair well, cleaning the house, siting in a hard chair, patient is limited in standing due to pain later    - Patient goals: 0/10 pain     Pain  - Current pain ratin/10  - At best pain ratin/10  - At worst pain ratin-6/10  - Location: bilateral lumbar  - Alleviating factors: ice, naproxen, sitting and relaxing    Social Support  - Steps to enter house: 3  - Stairs in house: 13  - Bedroom/bathroom set up:  - Lives in: house  - Lives with: fiance and daughter      Objective   LE MMT  - L Hip Flexion: 4+/5   R Hip Flexion: 4+/5  - L Hip Extension: 4+/5  R Hip Extension: 4+/5  - L Hip Abduction: 4/5  R Hip Abduction: 4/5  - L Hip Adduction: 4+/5  R Hip Adduction: 4+/5  - L Hip IR: 5/5   R Hip IR: 5/5  - L Hip ER: 5/5   R Hip ER: 5/5  - L Knee Extension: 4/5  R Knee Extension: 4+/5  - L Knee Flexion: 4/5  R Knee Flexion: 4+/5  - L Ankle DF: 5/5   R Ankle DF: 5/5  - L Ankle PF: 5/5   R Ankle PF: 5/5    -Right illiac crest slightly higher than left illiac crest    Movement  % Symptoms   Flexion 90 X 10 - slightly worse   Extension 20 X 10- slightly better   Side  R 100    Side  L 100    Rotation R 100    Rotation L  100      Sensation  - Light touch: intact   - denies saddle anesthesia  Palpation   -decreased tissue extensbility bilateral errectors      Special Testing L R   Flick test  negative negative   Standing flexion test  positive  negative   Prone knee flexion test positive  negative   Supine to long sit test positive  negative   Neurodynamic assessment               Insurance:  [place]/CMS  CMS Eval/ Re-eval POC expires Trish Ramirez #/ Referral # Total   Visits  Start date  Expiration date Extension  Visit limitation? PT only or  PT+OT?  Co-Insurance   [facility] 8/29 10/24                                                                          AUTH #: Date               Visits  Authed:  Used                Remaining                     Precautions: standard  Past Medical History:   Diagnosis Date    Abnormal Pap smear of cervix     Anxiety     Binge eating disorder     on wellbutrin    COVID-19 07/15/2022         Date 8/29/2022        Visit Number IE                 Manual         P-A mobs         Anterior innominate mobilization Performed with permission        upslip mob on right Performed with permission                 Neuro Re-ed         TrA progression HEP        Hip add squeeze         Clamshells          Bridge          Sciatic nerve sliders         Prone press up HEP        Thera Ex         CHARLY/REIL         TB shoulder ext         TB shoulder row         Paloff press          Anti-rotation press                                    Thera Activity         Patient education         Lifting mechanics         Posture education Modalities

## 2022-09-01 ENCOUNTER — OFFICE VISIT (OUTPATIENT)
Dept: PHYSICAL THERAPY | Facility: CLINIC | Age: 52
End: 2022-09-01
Payer: COMMERCIAL

## 2022-09-01 DIAGNOSIS — M54.50 ACUTE BILATERAL LOW BACK PAIN WITHOUT SCIATICA: Primary | ICD-10-CM

## 2022-09-01 DIAGNOSIS — M54.50 LUMBAR SPINE PAIN: ICD-10-CM

## 2022-09-01 PROCEDURE — 97112 NEUROMUSCULAR REEDUCATION: CPT

## 2022-09-01 PROCEDURE — 97110 THERAPEUTIC EXERCISES: CPT

## 2022-09-01 NOTE — PROGRESS NOTES
Daily Note     Today's date: 2022  Patient name: Dianna Beauchamp  : 1970  MRN: [id number]  Referring provider: Paddy Farris MD  Dx: No diagnosis found  Subjective: Patient reported into clinic with 4/10 pain in lumbar spine which is mostly in the center  Objective: See treatment diary below      Assessment: Tolerated treatment well  Patient would benefit from continued PT in order to build core and postural musculature  Patient was positive in forward flexion test on left and positive in supine to long sit for leg length going short to long  With patient permission, grade V anterior innominate thrust performed, with even leg length on test re-test  Pain sxs decreased to a 2/10  Continue to strengthen and assign single left hip flexion as HEP  Plan: Continue per plan of care             Insurance:  A/CMS  CMS Eval/ Re-eval POC expires Sheryl Toribioiphant #/ Referral # Total   Visits  Start date  Expiration date Extension  Visit limitation? PT only or  PT+OT?  Co-Insurance   [facility] 8/29 10/24      12                                                                                                                                AUTH #: Date                           Visits  Authed:  Used                             Remaining                                  Precautions: standard  Medical History        Past Medical History:   Diagnosis Date    Abnormal Pap smear of cervix      Anxiety      Binge eating disorder       on wellbutrin    COVID-19 07/15/2022               Date 2022           Visit Number IE                             Manual               P-A mobs               Anterior innominate mobilization Performed with permission  performed with permission, gr V           upslip mob on right Performed with permission                             Neuro Re-ed               TrA progression HEP             TA+ march  2*10       TA + heel slides  2*10                Hip add squeeze               Clamshells                Bridge     10x,  2*10 w ADD squeeze             Sciatic nerve sliders               SEVERO  3 min        Hip ABD/ ADD  2*10 b/l        Left hip flexion/ knee to chest         Prone press up HEP  2*10 with therapist overpressure           Thera Ex               CHARLY/REIL               TB shoulder ext    2*10 BTB           TB shoulder row    2*10 BTB           Paloff press     2*10 BTB           Anti-rotation press                band "pull the sword"    2*10 RTB b/l           Lateral walk   15' x 4 laps       Sit to stand  2*10                                                Thera Activity               Patient education               Lifting mechanics               Posture education                                                                                               Modalities

## 2022-09-06 ENCOUNTER — VBI (OUTPATIENT)
Dept: ADMINISTRATIVE | Facility: OTHER | Age: 52
End: 2022-09-06

## 2022-09-06 ENCOUNTER — OFFICE VISIT (OUTPATIENT)
Dept: PHYSICAL THERAPY | Facility: CLINIC | Age: 52
End: 2022-09-06
Payer: COMMERCIAL

## 2022-09-06 DIAGNOSIS — M54.50 LUMBAR SPINE PAIN: ICD-10-CM

## 2022-09-06 DIAGNOSIS — M54.50 ACUTE BILATERAL LOW BACK PAIN WITHOUT SCIATICA: Primary | ICD-10-CM

## 2022-09-06 PROCEDURE — 97110 THERAPEUTIC EXERCISES: CPT | Performed by: PHYSICAL THERAPIST

## 2022-09-06 NOTE — PROGRESS NOTES
Daily Note     Today's date: 2022  Patient name: Jolene Bo  : 1970  MRN: [id number]  Referring provider: Jennifer Wray MD  Dx:   Encounter Diagnosis     ICD-10-CM    1  Acute bilateral low back pain without sciatica  M54 50    2  Lumbar spine pain  M54 50                   Subjective: Pt reports having felt good for 2 days following last session, also reports having performed cat camel stretch at home and felt pain with this, this has set her back  She admits the pain started well after having performed this stretch  Objective: See treatment diary below  Rev HEP and floor xfer to maximize carryover of lordosis following extension based HEP  Assessment: Tolerated treatment well  Patient demo dec pain during today's session and good overlal technique throughout thjerex  Mod difficulty with floor xfer, vc for technique  Pt is well motivated  Plan: Continue per plan of care             Insurance:  AMA/CMS  CMS Eval/ Re-eval POC expires Azul Nolasco #/ Referral # Total   Visits  Start date  Expiration date Extension  Visit limitation? PT only or  PT+OT?  Co-Insurance   [facility] 8/29 10/24      12                                                                                                                                AUTH #: Date                           Visits  Authed:  Used                             Remaining                                  Precautions: standard  Medical History        Past Medical History:   Diagnosis Date    Abnormal Pap smear of cervix      Anxiety      Binge eating disorder       on wellbutrin    COVID-19 07/15/2022               Date 2022         Visit Number IE  2/12  3/12                         Manual               P-A mobs               Anterior innominate mobilization Performed with permission  performed with permission, gr V           upslip mob on right Performed with permission                             Neuro Re-ed             TrA progression HEP             TA+ march  2*10 2x10      TA + heel slides  2*10 2x10               Hip add squeeze     With bridging         Clamshells                Bridge     10x,  2*10 w ADD squeeze    2x10 with Add Sq         Sciatic nerve sliders               SEVERO  3 min  3'      Hip ABD/ ADD  2*10 b/l        Left hip flexion/ knee to chest         Prone press up HEP  2*10 with therapist overpressure  2x8         Thera Ex               CHARLY/REIL               TB shoulder ext    2*10 BTB  2x10 BTB         TB shoulder row    2*10 BTB  2x10 BTB         Paloff press     2*10 BTB  2x10 BTB         Anti-rotation press                band "pull the sword"    2*10 RTB b/l  2x10 RTB          Lateral walk   15' x 4 laps 15' x 4 laps Medium TB loop      Sit to stand  2*10 10x squat (buttock tap to chair)                                               Thera Activity               Patient education      Floor xfer         Lifting mechanics               Posture education                                                                                               Modalities

## 2022-09-06 NOTE — TELEPHONE ENCOUNTER
09/06/22 3:39 PM     See documentation in the [employer] SmartForm   gaps    [facility]

## 2022-09-08 ENCOUNTER — OFFICE VISIT (OUTPATIENT)
Dept: PHYSICAL THERAPY | Facility: CLINIC | Age: 52
End: 2022-09-08
Payer: COMMERCIAL

## 2022-09-08 DIAGNOSIS — M54.50 LUMBAR SPINE PAIN: ICD-10-CM

## 2022-09-08 DIAGNOSIS — M54.50 ACUTE BILATERAL LOW BACK PAIN WITHOUT SCIATICA: Primary | ICD-10-CM

## 2022-09-08 PROCEDURE — 97110 THERAPEUTIC EXERCISES: CPT

## 2022-09-08 PROCEDURE — 97112 NEUROMUSCULAR REEDUCATION: CPT

## 2022-09-08 NOTE — PROGRESS NOTES
Daily Note     Today's date: 2022  Patient name: Jan Houston  : 1970  MRN: [id number]  Referring provider: Jonelle Newman MD  Dx:   Encounter Diagnosis     ICD-10-CM    1  Acute bilateral low back pain without sciatica  M54 50    2  Lumbar spine pain  M54 50                   Subjective: Patient reports some pain in central lower back, more in the morning, that gets better during the day and hurts when she bends down with a full water bowl for the dog  Objective: See treatment diary below      Assessment: Tolerated treatment well  Patient would benefit from continued PT  Patient will benefit from building core and postural musculature the decrease load on lumbar spine  Patient was fatigued by core work at end of session  Plan: Continue per plan of care             Insurance:  AMA/CMS  CMS Eval/ Re-eval POC expires Timmy Bowl #/ Referral # Total   Visits  Start date  Expiration date Extension  Visit limitation? PT only or  PT+OT?  Co-Insurance   [facility] 8/29 10/24      12                                                                                                                                AUTH #: Date                           Visits  Authed:  Used                             Remaining                                  Precautions: standard  Medical History        Past Medical History:   Diagnosis Date    Abnormal Pap smear of cervix      Anxiety      Binge eating disorder       on wellbutrin    COVID-19 07/15/2022             Date 2022       Visit Number IE  2/12  3/12  4/12                       Manual               P-A mobs               Anterior innominate mobilization Performed with permission  performed with permission, gr V    side lying L3 on L4 closing mob gr lll-lV       upslip mob on right Performed with permission                             Neuro Re-ed               TrA progression HEP             TA+ march  2*10 2x10 2*10 + overhead pulldown BTB TA + heel slides  2*10 2x10 20x ea              Hip add squeeze     With bridging         Clamshells                Bridge     10x,  2*10 w ADD squeeze    2x10 with Add Sq  2*10, + 2*10 with march,  with ADD squeeze       Sciatic nerve sliders        supine press forearm into 1/2 foam into thigh b/l 2*10       SEVERO  3 min  3'      Hip ABD/ ADD  2*10 b/l        Left hip flexion/ knee to chest         Prone press up HEP  2*10 with therapist overpressure  2x8         Thera Ex               CHARLY/REIL        2*10 on plinth       TB shoulder ext    2*10 BTB  2x10 BTB  2*10 BTB       TB shoulder row    2*10 BTB  2x10 BTB  2*10 BTB       Paloff press     2*10 BTB  2x10 BTB         Anti-rotation press                band "pull the sword"    2*10 RTB b/l  2x10 RTB          Lateral walk   15' x 4 laps 15' x 4 laps Medium TB loop      Sit to stand  2*10 10x squat (buttock tap to chair)      Cat cow    2*10      cat to glutes on heels        5 sec hold, 10x        wisdom pose with walkout        b/l 5* each side 10sec hold       Thera Activity               Patient education      Floor xfer         Lifting mechanics               Posture education                                                                                               Modalities

## 2022-09-09 ENCOUNTER — TELEPHONE (OUTPATIENT)
Dept: PSYCHIATRY | Facility: CLINIC | Age: 52
End: 2022-09-09

## 2022-09-12 ENCOUNTER — OFFICE VISIT (OUTPATIENT)
Dept: PHYSICAL THERAPY | Facility: CLINIC | Age: 52
End: 2022-09-12
Payer: COMMERCIAL

## 2022-09-12 DIAGNOSIS — M54.50 ACUTE BILATERAL LOW BACK PAIN WITHOUT SCIATICA: Primary | ICD-10-CM

## 2022-09-12 DIAGNOSIS — M54.50 LUMBAR SPINE PAIN: ICD-10-CM

## 2022-09-12 PROCEDURE — 97110 THERAPEUTIC EXERCISES: CPT

## 2022-09-12 PROCEDURE — 97112 NEUROMUSCULAR REEDUCATION: CPT

## 2022-09-12 NOTE — PROGRESS NOTES
Daily Note     Today's date: 2022  Patient name: Karin Norman  : 1970  MRN: [id number]  Referring provider: Laine Carreon MD  Dx:   Encounter Diagnosis     ICD-10-CM    1  Acute bilateral low back pain without sciatica  M54 50    2  Lumbar spine pain  M54 50                   Subjective: Patient reports a discomfort level of 1-2/10 at beginning of session and <1/10 at end of session       Objective: See treatment diary below      Assessment: Tolerated treatment well  Patient would benefit from continued PT in order to continue building core and postural strength  Patient is progressing well  Patiene needed verbal cueing to weight shift back during sit to stand  Plan: Continue per plan of care             Insurance:  AMA/CMS  CMS Eval/ Re-eval POC expires Augie Ramos #/ Referral # Total   Visits  Start date  Expiration date Extension  Visit limitation? PT only or  PT+OT?  Co-Insurance   [facility] 8/29 10/24      12                                                                                                                                AUTH #: Date                           Visits  Authed:  Used                             Remaining                                  Precautions: standard  Medical History        Past Medical History:   Diagnosis Date    Abnormal Pap smear of cervix      Anxiety      Binge eating disorder       on wellbutrin    COVID-19 07/15/2022             Date 2022     Visit Number IE  2/12  3/12  4/12  5/12                     Manual               P-A mobs               Anterior innominate mobilization Performed with permission  performed with permission, gr V    side lying L3 on L4 closing mob gr lll-lV       upslip mob on right Performed with permission                             Neuro Re-ed               TrA progression HEP             TA+ march  2*10 2x10 2*10 + overhead pulldown BTB  2*10 with overhead pulldown BTB, + 20x w     TA + heel slides  2*10 2x10 20x ea              Hip add squeeze     With bridging         Clamshells                Bridge     10x,  2*10 w ADD squeeze    2x10 with Add Sq  2*10, + 2*10 with march,  with ADD squeeze  2*10, + 2*10 with march, + 2*10 ADD squeeze     Sciatic nerve sliders        supine press forearm into 1/2 foam into thigh b/l 2*10  supine press forearm into 1/2 foam into thigh b/l 2*10     SEVERO  3 min  3'      Hip ABD/ ADD  2*10 b/l    2*10 red loop    Left hip flexion/ knee to chest         Prone press up HEP  2*10 with therapist overpressure  2x8    2*10 with therapist overpressure     Thera Ex               CHARLY/REIL        2*10 on plinth       TB shoulder ext    2*10 BTB  2x10 BTB  2*10 BTB  2*10 GTB     TB shoulder row    2*10 BTB  2x10 BTB  2*10 BTB  2*10 GTB     Paloff press     2*10 BTB  2x10 BTB    2*10 GTB b/l     Anti-rotation press                band "pull the sword"    2*10 RTB b/l  2x10 RTB          Lateral walk   15' x 4 laps 15' x 4 laps Medium TB loop  15' x 4 laps red loop     Sit to stand  2*10 10x squat (buttock tap to chair)  2*10     Cat cow    2*10 2*10     cat to glutes on heels        5 sec hold, 10x  5 sec hold, 10x b/l 5* each side           Back extension over theraball 2*10                       wisdom pose with walkout        b/l 5* each side 10sec hold  10 sec hold     Thera Activity               Patient education      Floor xfer         Lifting mechanics               Posture education                                                                                               Modalities

## 2022-09-15 ENCOUNTER — OFFICE VISIT (OUTPATIENT)
Dept: PHYSICAL THERAPY | Facility: CLINIC | Age: 52
End: 2022-09-15
Payer: COMMERCIAL

## 2022-09-15 DIAGNOSIS — M54.50 LUMBAR SPINE PAIN: ICD-10-CM

## 2022-09-15 DIAGNOSIS — M54.50 ACUTE BILATERAL LOW BACK PAIN WITHOUT SCIATICA: Primary | ICD-10-CM

## 2022-09-15 PROCEDURE — 97110 THERAPEUTIC EXERCISES: CPT

## 2022-09-15 PROCEDURE — 97112 NEUROMUSCULAR REEDUCATION: CPT

## 2022-09-15 NOTE — PROGRESS NOTES
Daily Note         Today's date: 9/15/2022  Patient name: Maury Jo  : 1970  MRN: [id number]  Referring provider: Abelardo Taylor MD  Dx:   Encounter Diagnosis     ICD-10-CM    1  Acute bilateral low back pain without sciatica  M54 50    2  Lumbar spine pain  M54 50        Subjective: Maury Jo reports pain only occurs when she flexes forward pain level 1/10  Objective: See treatment diary below    Assessment: Patient knew to PT   min to mod LOM noted with prone press up  patient needed cuing for facilitating glutes with bridging  Was able to perform without low back pain  Plan: continue as indicated by primary PT                Insurance:  AMA/CMS  CMS Eval/ Re-eval POC expires Trish Ramirez #/ Referral # Total   Visits  Start date  Expiration date Extension  Visit limitation? PT only or  PT+OT?  Co-Insurance   [facility] 8/29 10/24      12                                                                                                                                AUTH #: Date                           Visits  Authed:  Used                             Remaining                                  Precautions: standard  Medical History        Past Medical History:   Diagnosis Date    Abnormal Pap smear of cervix      Anxiety      Binge eating disorder       on wellbutrin    COVID-19 07/15/2022             Date 2022  9/1/22  9/6/22  9/8/22  9/12/22  9/15/22   Visit Number IE  2/12  3/12  4/12  5/12  6/12                   Manual               P-A mobs               Anterior innominate mobilization Performed with permission  performed with permission, gr V    side lying L3 on L4 closing mob gr lll-lV    --   upslip mob on right Performed with permission                             Neuro Re-ed               TrA progression HEP             TA+ march  2*10 2x10 2*10 + overhead pulldown BTB  2*10 with overhead pulldown BTB, + 20x w  TA: 5 sec x 10    + march: 20x     + pulldown: blue: 20 x TA + heel slides  2*10 2x10 20x ea  --            Hip add squeeze     With bridging         Clamshells                Bridge     10x,  2*10 w ADD squeeze    2x10 with Add Sq  2*10, + 2*10 with march,  with ADD squeeze  2*10, + 2*10 with march, + 2*10 ADD squeeze  + hip add iso: 2 x 10    + march: 2 x 10     Sciatic nerve sliders        supine press forearm into 1/2 foam into thigh b/l 2*10  supine press forearm into 1/2 foam into thigh b/l 2*10 2 x 10     SEVERO  3 min  3'      Hip ABD/ ADD  2*10 b/l    2*10 red loop Red: 2 x 10     Left hip flexion/ knee to chest         Prone press up HEP  2*10 with therapist overpressure  2x8    2*10 with therapist overpressure  standard: 10 x   PT overpressure: 10 x   Thera Ex               CHARLY/REIL        2*10 on plinth       TB shoulder ext    2*10 BTB  2x10 BTB  2*10 BTB  2*10 GTB  grn 2 x 10     TB shoulder row    2*10 BTB  2x10 BTB  2*10 BTB  2*10 GTB  grn 2 x 10     Paloff press     2*10 BTB  2x10 BTB    2*10 GTB b/l  grn 2 x 10     Anti-rotation press                band "pull the sword"    2*10 RTB b/l  2x10 RTB          Lateral walk   15' x 4 laps 15' x 4 laps Medium TB loop  15' x 4 laps red loop  Red loop: 15 feet x 4 laps   Sit to stand  2*10 10x squat (buttock tap to chair)  2*10  To standard chair: 10 x 2    Cat cow    2*10 2*10 2 x 10      cat to glutes on heels        5 sec hold, 10x  5 sec hold, 10x b/l 5* each side   5 sec x 10 , 5 sec x 5 cole         Back extension over theraball 2*10 --                      wisdom pose with walkout        b/l 5* each side 10sec hold  10 sec hold  --   Thera Activity               Patient education      Floor xfer         Lifting mechanics               Posture education                                                                                               Modalities

## 2022-09-19 ENCOUNTER — OFFICE VISIT (OUTPATIENT)
Dept: OTOLARYNGOLOGY | Facility: CLINIC | Age: 52
End: 2022-09-19
Payer: COMMERCIAL

## 2022-09-19 ENCOUNTER — OFFICE VISIT (OUTPATIENT)
Dept: AUDIOLOGY | Facility: CLINIC | Age: 52
End: 2022-09-19
Payer: COMMERCIAL

## 2022-09-19 VITALS — TEMPERATURE: 97.2 F | BODY MASS INDEX: 22.88 KG/M2 | WEIGHT: 134 LBS | HEIGHT: 64 IN

## 2022-09-19 DIAGNOSIS — Z01.10 NORMAL BEHAVIORAL AUDIOMETRY: Primary | ICD-10-CM

## 2022-09-19 DIAGNOSIS — J31.0 RHINITIS, CHRONIC: ICD-10-CM

## 2022-09-19 DIAGNOSIS — Z01.10 NORMAL HEARING TEST: ICD-10-CM

## 2022-09-19 DIAGNOSIS — M26.623 BILATERAL TEMPOROMANDIBULAR JOINT PAIN: Primary | ICD-10-CM

## 2022-09-19 DIAGNOSIS — J34.89 SINUS PAIN: ICD-10-CM

## 2022-09-19 DIAGNOSIS — R09.82 POST-NASAL DRIP: ICD-10-CM

## 2022-09-19 PROCEDURE — 99204 OFFICE O/P NEW MOD 45 MIN: CPT | Performed by: STUDENT IN AN ORGANIZED HEALTH CARE EDUCATION/TRAINING PROGRAM

## 2022-09-19 PROCEDURE — 92567 TYMPANOMETRY: CPT | Performed by: AUDIOLOGIST

## 2022-09-19 PROCEDURE — 92557 COMPREHENSIVE HEARING TEST: CPT | Performed by: AUDIOLOGIST

## 2022-09-19 NOTE — PROGRESS NOTES
[facility]  [facility] [address] [facility] [facility] Otolaryngology  Otolaryngology -- Head and Neck Surgery New Patient Visit  Travis Singer is a 47 y o  who presents with a chief complaint of     Nose and sinuses: The patient is complaining of post nasal drip, left sided facial pressure and headache, however denied any history of sneezing, itchy eyes and nose, loss of smell,chronic cough, frequent throat clearing or nasal bleeding  There is no history of nasal trauma or surgeries  Also no history of bronchial asthma  no recent CT scan sinuses  no allergy skin testing       Also complaiing of hearing of loss    Normal hearing test   Audiogram and tympanogram today indicated normal hearing  Review of systems: Pertinent review of systems documented in the HPI  10 point ROS documented in a separate note, as necessary  Results reviewed; images from any scan have been personally reviewed: The past medical, surgical, social and family history have been reviewed as documented in today's record  Past Medical History:   Diagnosis Date    Abnormal Pap smear of cervix     Anxiety     Binge eating disorder     on wellbutrin    COVID-19 07/15/2022     Past Surgical History:   Procedure Laterality Date    CERVICAL BIOPSY  W/ LOOP ELECTRODE EXCISION      Age 26    CHOLECYSTECTOMY      GASTRIC BYPASS       Family History   Problem Relation Age of Onset    Heart disease Mother     COPD Mother     Other Mother      Current Outpatient Medications on File Prior to Visit   Medication Sig Dispense Refill    B Complex Vitamins (VITAMIN B COMPLEX PO) Take by mouth every other day        buPROPion (WELLBUTRIN XL) 300 mg 24 hr tablet Take 1 tablet (300 mg total) by mouth in the morning  90 tablet 1    clobetasol (TEMOVATE) 0 05 % cream Apply 5 g (1 application total) topically 2 (two) times a day for 14 days, THEN 5 g (1 application total) 3 (three) times a week   60 g 3    Collagen-Boron-Hyaluronic Acid (Move Free [employer]) 40-5-3 3 MG TABS Take by mouth      cyclobenzaprine (FLEXERIL) 10 mg tablet Take 1 tablet (10 mg total) by mouth 3 (three) times a day as needed for muscle spasms 20 tablet 0    LORazepam (ATIVAN) 1 mg tablet TAKE 1 TABLET BY MOUTH EVERY 8 HOURS AS NEEDED FOR ANXIETY 30 tablet 0    multivitamin (THERAGRAN) TABS Take 1 tablet by mouth daily      vitamin A 2400 MCG (8000 UT) capsule Take 8,000 Units by mouth daily      naproxen (NAPROSYN) 500 mg tablet Take 1 tablet (500 mg total) by mouth 2 (two) times a day with meals for 10 days 20 tablet 0    [DISCONTINUED] methylPREDNISolone 4 MG tablet therapy pack Use as directed on package 21 each 0     No current facility-administered medications on file prior to visit  Physical exam:   Temp (!) 97 2 °F (36 2 °C) (Temporal)   Ht 5' 3 75" (1 619 m)   Wt 60 8 kg (134 lb)   BMI 23 18 kg/m²   Head: Atraumatic, no visible scalp lesions, parotid and submandibular salivary glands non-tender to palpation and without masses bilaterally  Neck:  No visible or palpable cervical lesions or lymphadenopathy, thyroid gland is normal in size and symmetry and without masses, normal laryngeal elevation with swallowing  Ears:    Right ear :  Auricle normal in appearance, mastoid prominence non-tender, external auditory canal clear  Tympanic membranes intact  TMJ tenderness  Left ear :  Auricle normal in appearance, mastoid prominence non-tender, external auditory canal clear   Tympanic membranes intact  TMJ tenderness  Nose/Sinuses:  External appearance unremarkable, no maxillary or frontal sinus tenderness to palpation bilaterally  Anterior rhinoscopy reveals: Nasal endoscopic examination showed deviated nasal septum, bilateral enlarged inferior turbinates, no polyps, thick clear mucus, middle, superior meatus and sphenoethmoid recess clear    Oral Cavity:  Moist mucus membranes, gums and dentition unremarkable, no oral mucosal masses or lesions, floor of mouth soft, tongue mobile without masses or lesions  Oropharynx:  Base of tongue soft and without masses, tonsils bilaterally unremarkable, soft palate mucosa unremarkable  Eyes:  Extra-ocular movements intact, pupils equally round and reactive to light and accommodation, the lids and conjunctivae are normal in appearance  Constitutional:  Well developed, well nourished and groomed, in no acute distress  Cardiovascular:  Normal rate and rhythm, no palpable thrills, no jugulovenous distension observed  Respiratory:  Normal respiratory effort without evidence of retractions or use of accessory muscles  Neurologic:  Cranial nerves II-XII intact bilaterally  Abdomen: Soft and lax  Extremities: No bruises   Psychiatric:  Alert and oriented to time, place and person  Procedures    Assessment:   1  Sinus pain  Ambulatory Referral to Otolaryngology     Orders  No orders of the defined types were placed in this encounter  Discussion/Plan:      Normal hearing test   Audiogram and tympanogram today indicated normal hearing  Rhinitis and Post nasal drip   Discussed treatment options including use of saline rinses, nasal steroids, allergy medications, allergy testing, imaging, and further surgical interventions  Given instructions on use of nasal steroids, saline rinses and allergy medications  Temporomandibular Joint (TMJ) Dysfunction and Myofascial Pain Syndrome  Instructions given to: Avoid eating chewy foods  Use moist heat to affected joint  Massage tothe joint and surrounding muscles   Avoid excessive mouth opening such as with yawning or taking a large bite of food  TMJ physical therapy (PT) - a referral to a PT who specializes in TMJ therapy option given  Evaluation of your bite (occlusion) by your dentist  Evaluation of the TMJ by an oral surgeon    Medications:   Pain relievers and anti-inflammatories (Acetaminophen or NSAID) like ibuprofen  Tricyclic antidepressants  Muscle relaxants

## 2022-09-19 NOTE — PROGRESS NOTES
ADULT ENT HEARING EVALUATION - [place] [address] AUDIOLOGY      Patient Name: Diane Ash   MRN:  [id number]   :  1970   Age: 47 y o  Gender: female   DOS: 2022     HISTORY:     Diane Ash, a 47 y o  female, was seen on 2022 at the referral of Lance Berg MD, for an evaluation of hearing as part of her ENT visit  Ms Limon's primary complaint is ccasional difficulty hearing  She noted that sometimes she is reliant on captioning  She denied otalgia, otorrhea, aural fullness, tinnitus and dizziness  Ms Mary Truong has not had her hearing tested previously  RESULTS:    Otoscopic Evaluation:   Right Ear: Unremarkable, canal clear   Left Ear: Unremarkable, canal clear    Tympanometry:   Right Ear: Type A; normal middle ear pressure and static compliance    Left Ear: Type A; normal middle ear pressure and static compliance     Audiometry:  Conventional pure tone audiometry from 250 - 8000 Hz  obtained with good reliability and revealed the following:     Right Ear: normal hearing sensitivity   Left Ear: normal hearing sensitivity     Speech Audiometry:    Speech Reception (SRT)   Right Ear: 5 dB HL   Left Ear: 5 dB HL    Word Recognition Scores (WRS):  Right Ear: excellent (100 % correct)     Left Ear: excellent (100 % correct)   Stimuli: [id number]    *see attached audiogram*      RECOMMENDATIONS:    1 ) Follow-up with referring provider  2 ) Further audiometric testing PRN  It was a pleasure working with Ms Limon today  Thank you for referring this patient  Lorraine Herrera    Clinical Audiologist    [address] [place] [id number]

## 2022-09-20 ENCOUNTER — OFFICE VISIT (OUTPATIENT)
Dept: PHYSICAL THERAPY | Facility: CLINIC | Age: 52
End: 2022-09-20
Payer: COMMERCIAL

## 2022-09-20 DIAGNOSIS — M54.50 ACUTE BILATERAL LOW BACK PAIN WITHOUT SCIATICA: Primary | ICD-10-CM

## 2022-09-20 DIAGNOSIS — M54.50 LUMBAR SPINE PAIN: ICD-10-CM

## 2022-09-20 PROCEDURE — 97110 THERAPEUTIC EXERCISES: CPT | Performed by: PHYSICAL THERAPIST

## 2022-09-20 PROCEDURE — 97112 NEUROMUSCULAR REEDUCATION: CPT | Performed by: PHYSICAL THERAPIST

## 2022-09-20 NOTE — PROGRESS NOTES
Daily Note     Today's date: 2022  Patient name: Duane Andino  : 1970  MRN: [id number]  Referring provider: Carlos Snow MD  Dx:   Encounter Diagnosis     ICD-10-CM    1  Acute bilateral low back pain without sciatica  M54 50    2  Lumbar spine pain  M54 50                   Subjective: Pt is pleased to report that overall she is doing very well, did not have pain waking yesterday in the moring and while she is tight today wa sable to successfully clean the house  Pt does not have pain today  Objective: See treatment diary below      Assessment: Tolerated treatment well  Patient demo good technique throughout, apprheension with REIL beyond midrange however flexion remains pt litmus test despite discomfort wiht flexion based tasks      Plan: Continue per plan of care             Insurance:  AMA/CMS  CMS Eval/ Re-eval POC expires Chioma Wood #/ Referral # Total   Visits  Start date  Expiration date Extension  Visit limitation? PT only or  PT+OT?  Co-Insurance   [facility] 8/29 10/24      12                                                                                                                                AUTH #: Date                           Visits  Authed:  Used                             Remaining                                  Precautions: standard  Medical History        Past Medical History:   Diagnosis Date    Abnormal Pap smear of cervix      Anxiety      Binge eating disorder       on wellbutrin    COVID-19 07/15/2022             Date 2022  9/1/22  9/6/22  9/8/22  9/12/22  9/15/22 9/20/22   Visit Number IE  2/12  3/12  4/12  5/12  6/12 7/12                    Manual                P-A mobs                Anterior innominate mobilization Performed with permission  performed with permission, gr V    side lying L3 on L4 closing mob gr lll-lV    --    upslip mob on right Performed with permission                               Neuro Re-ed                TrA progression HEP              TA+ march  2*10 2x10 2*10 + overhead pulldown BTB  2*10 with overhead pulldown BTB, + 20x w  TA: 5 sec x 10    + march: 20x     + pulldown: blue: 20 x  TA: 5 sec x 10    + march: 20x     + pulldown: blue: 10 x    TA + heel slides  2*10 2x10 20x ea  --              Hip add squeeze     With bridging          Clamshells                 Bridge     10x,  2*10 w ADD squeeze    2x10 with Add Sq  2*10, + 2*10 with march,  with ADD squeeze  2*10, + 2*10 with march, + 2*10 ADD squeeze  + hip add iso: 2 x 10    + march: 2 x 10   + hip add iso: 2 x 10    + march: 2 x 10     Sciatic nerve sliders        supine press forearm into 1/2 foam into thigh b/l 2*10  supine press forearm into 1/2 foam into thigh b/l 2*10 2 x 10   2x10   SEVERO  3 min  3'       Hip ABD/ ADD  2*10 b/l    2*10 red loop Red: 2 x 10   2x10   Left hip flexion/ knee to chest          Prone press up HEP  2*10 with therapist overpressure  2x8    2*10 with therapist overpressure  standard: 10 x   PT overpressure: 10 x 10x ea   Thera Ex                CHARLY/REIL        2*10 on plinth        TB shoulder ext    2*10 BTB  2x10 BTB  2*10 BTB  2*10 GTB  grn 2 x 10   GTB 2x10   TB shoulder row    2*10 BTB  2x10 BTB  2*10 BTB  2*10 GTB  grn 2 x 10   GTB 2x10   Paloff press     2*10 BTB  2x10 BTB    2*10 GTB b/l  grn 2 x 10   GTB 2x10   Anti-rotation press                 band "pull the sword"    2*10 RTB b/l  2x10 RTB        RTB 2x10   Lateral walk   15' x 4 laps 15' x 4 laps Medium TB loop  15' x 4 laps red loop  Red loop: 15 feet x 4 laps Red loop 4 laps at parallel bars   Sit to stand  2*10 10x squat (buttock tap to chair)  2*10  To standard chair: 10 x 2  10x2   Cat cow    2*10 2*10 2 x 10   2x10    cat to glutes on heels        5 sec hold, 10x  5 sec hold, 10x b/l 5* each side   5 sec x 10 , 5 sec x 5 cole  Rev ea direction        Back extension over theraball 2*10 --                         wisdom pose with walkout        b/l 5* each side 10sec hold  10 sec hold  -- Thera Activity                Patient education      Floor xfer          Lifting mechanics                Posture education                                                                                                     Modalities

## 2022-09-21 ENCOUNTER — OFFICE VISIT (OUTPATIENT)
Dept: PHYSICAL THERAPY | Facility: CLINIC | Age: 52
End: 2022-09-21
Payer: COMMERCIAL

## 2022-09-21 DIAGNOSIS — M54.50 LUMBAR SPINE PAIN: Primary | ICD-10-CM

## 2022-09-21 DIAGNOSIS — M54.50 ACUTE BILATERAL LOW BACK PAIN WITHOUT SCIATICA: ICD-10-CM

## 2022-09-21 PROCEDURE — 97112 NEUROMUSCULAR REEDUCATION: CPT

## 2022-09-21 PROCEDURE — 97110 THERAPEUTIC EXERCISES: CPT

## 2022-09-21 NOTE — PROGRESS NOTES
Daily Note         Today's date: 2022  Patient name: Ainsley Hanks  : 1970  MRN: [id number]  Referring provider: Low Mitchell MD  Dx:   Encounter Diagnosis     ICD-10-CM    1  Lumbar spine pain  M54 50    2  Acute bilateral low back pain without sciatica  M54 50        Subjective: Ainsley Hanks reports no pain entering today only stiffness  Objective: See treatment diary below    Assessment:  increased cuing throughout session to facilitate TA while performing stab actvities  patient still has moderate LOM in prone lumbar extension with prone pressu ups  after treatment patient was able to perform forward lumbar flexion with a 1/10  Plan: patient intereste din more information about gym membership at wellness center and treatment for TMJ                Insurance:  AMA/CMS  CMS Eval/ Re-eval POC expires Dorna Said #/ Referral # Total   Visits  Start date  Expiration date Extension  Visit limitation? PT only or  PT+OT?  Co-Insurance   [facility] 8/29 10/24      12                                                                                                                                AUTH #: Date                           Visits  Authed:  Used                             Remaining                                  Precautions: standard  Medical History        Past Medical History:   Diagnosis Date    Abnormal Pap smear of cervix      Anxiety      Binge eating disorder       on wellbutrin    COVID-19 07/15/2022             Date  9/6/22  9/8/22  9/12/22  9/15/22 9/20/22 9/21/22   Visit Number  3/12  4/12  5/12  6/12 7/12 8/12                 Manual             P-A mobs             Anterior innominate mobilization    side lying L3 on L4 closing mob gr lll-lV    --     upslip mob on right                           Neuro Re-ed             TrA progression             TA+ march 2x10 2*10 + overhead pulldown BTB  2*10 with overhead pulldown BTB, + 20x w  TA: 5 sec x 10    + march: 20x + pulldown: blue: 20 x  TA: 5 sec x 10    + march: 20x     + pulldown: blue: 10 x  TA: 5 sec x 10    + march: 20 x    + pulldown: blue: 10 x    TA + heel slides 2x10 20x ea  --              Hip add squeeze With bridging           Clamshells              Bridge   2x10 with Add Sq  2*10, + 2*10 with march,  with ADD squeeze  2*10, + 2*10 with march, + 2*10 ADD squeeze  + hip add iso: 2 x 10    + march: 2 x 10   + hip add iso: 2 x 10    + march: 2 x 10   + hip add iso: 2 x 10     + march: 2 x 10     Sciatic nerve sliders    supine press forearm into 1/2 foam into thigh b/l 2*10  supine press forearm into 1/2 foam into thigh b/l 2*10 2 x 10   2x10 2 x 10     SEVERO 3'        Hip ABD/ ADD   2*10 red loop Red: 2 x 10   2x10 Red: 2 x 10     Left hip flexion/ knee to chest         Prone press up  2x8    2*10 with therapist overpressure  standard: 10 x   PT overpressure: 10 x 10x ea  standard: 10 x   Manual OP: 10 x    Thera Ex             CHARLY/REIL    2*10 on plinth         TB shoulder ext  2x10 BTB  2*10 BTB  2*10 GTB  grn 2 x 10   GTB 2x10 GRN: 10 x 2   TB shoulder row  2x10 BTB  2*10 BTB  2*10 GTB  grn 2 x 10   GTB 2x10 GRN: 2 x 10     Paloff press   2x10 BTB    2*10 GTB b/l  grn 2 x 10   GTB 2x10 GRN: (2) 2 x 10     Anti-rotation press              band "pull the sword"  2x10 RTB        RTB 2x10 Red (2) ( PNF lift): 10 x 2 cole    Lateral walk  15' x 4 laps Medium TB loop  15' x 4 laps red loop  Red loop: 15 feet x 4 laps Red loop 4 laps at parallel bars Red loop ( heavy) 4 laps    Sit to stand 10x squat (buttock tap to chair)  2*10  To standard chair: 10 x 2  10x2 2 x 10     Cat cow  2*10 2*10 2 x 10   2x10 20 x    cat to glutes on heels    5 sec hold, 10x  5 sec hold, 10x b/l 5* each side   5 sec x 10 , 5 sec x 5 cole  Rev ea direction       Back extension over theraball 2*10 --                        wisdom pose with walkout    b/l 5* each side 10sec hold  10 sec hold  --     Thera Activity             Patient education  Floor xfer           Lifting mechanics             Posture education                                                                                   Modalities

## 2022-09-22 ENCOUNTER — APPOINTMENT (OUTPATIENT)
Dept: PHYSICAL THERAPY | Facility: CLINIC | Age: 52
End: 2022-09-22
Payer: COMMERCIAL

## 2022-09-26 ENCOUNTER — OFFICE VISIT (OUTPATIENT)
Dept: PHYSICAL THERAPY | Facility: CLINIC | Age: 52
End: 2022-09-26
Payer: COMMERCIAL

## 2022-09-26 DIAGNOSIS — M54.50 ACUTE BILATERAL LOW BACK PAIN WITHOUT SCIATICA: ICD-10-CM

## 2022-09-26 DIAGNOSIS — M54.50 LUMBAR SPINE PAIN: Primary | ICD-10-CM

## 2022-09-26 PROCEDURE — 97110 THERAPEUTIC EXERCISES: CPT

## 2022-09-26 PROCEDURE — 97112 NEUROMUSCULAR REEDUCATION: CPT

## 2022-09-26 NOTE — PROGRESS NOTES
Daily Note     Today's date: 2022  Patient name: Maci Corey  : 1970  MRN: [id number]  Referring provider: Christel Dunn MD  Dx:   Encounter Diagnosis     ICD-10-CM    1  Lumbar spine pain  M54 50    2  Acute bilateral low back pain without sciatica  M54 50        Start Time: 1800  Stop Time: 1845  Total time in clinic (min): 45 minutes    Subjective: Patient reports a discomfort level of 1/10 in lower back  Objective: See treatment diary below      Assessment: Tolerated treatment well  Patient would benefit from continued PT  Patient did well with  the addition of back extensions over blue theraball  Revised sit to stand to wall squats with theraball due to knee pain  Exercises targeted to things that can be replicated in work environment  Continue to progress core and postural musculature strengthening  Plan: Continue per plan of care             Insurance:  AMA/CMS  CMS Eval/ Re-eval POC expires Twila Lila #/ Referral # Total   Visits  Start date  Expiration date Extension  Visit limitation? PT only or  PT+OT?  Co-Insurance   [facility] 8/29 10/24      12                                                                                                                                AUTH #: Date                           Visits  Authed:  Used                             Remaining                                  Precautions: standard  Medical History        Past Medical History:   Diagnosis Date    Abnormal Pap smear of cervix      Anxiety      Binge eating disorder       on wellbutrin    COVID-19 07/15/2022             Date  9/6/22  9/8/22  9/12/22  9/15/22 9/20/22 9/21/22 9/26/22   Visit Number  3/12  4/12  5/12  6/12 7/12 8/12 9/12- FOTO closed                  Manual              P-A mobs              Anterior innominate mobilization    side lying L3 on L4 closing mob gr lll-lV    --      upslip mob on right                             Neuro Re-ed              TrA progression         TA+ march 2x10 2*10 + overhead pulldown BTB  2*10 with overhead pulldown BTB, + 20x w  TA: 5 sec x 10    + march: 20x     + pulldown: blue: 20 x  TA: 5 sec x 10    + march: 20x     + pulldown: blue: 10 x  TA: 5 sec x 10    + march: 20 x    + pulldown: blue: 10 x  20x bridge w TA   TA + heel slides 2x10 20x ea  --                Hip add squeeze With bridging            Clamshells               Bridge   2x10 with Add Sq  2*10, + 2*10 with march,  with ADD squeeze  2*10, + 2*10 with march, + 2*10 ADD squeeze  + hip add iso: 2 x 10    + march: 2 x 10   + hip add iso: 2 x 10    + march: 2 x 10   + hip add iso: 2 x 10     + march: 2 x 10      Sciatic nerve sliders    supine press forearm into 1/2 foam into thigh b/l 2*10  supine press forearm into 1/2 foam into thigh b/l 2*10 2 x 10   2x10 2 x 10      SEVERO 3'         Hip ABD/ ADD   2*10 red loop Red: 2 x 10   2x10 Red: 2 x 10   Blue loop 2*10   Left hip flexion/ knee to chest          Prone press up  2x8    2*10 with therapist overpressure  standard: 10 x   PT overpressure: 10 x 10x ea  standard: 10 x   Manual OP: 10 x  CHARLY 2*10 against counter   Thera Ex              CHARLY/REIL    2*10 on plinth          TB shoulder ext  2x10 BTB  2*10 BTB  2*10 GTB  grn 2 x 10   GTB 2x10 GRN: 10 x 2 GTB 2*10   TB shoulder row  2x10 BTB  2*10 BTB  2*10 GTB  grn 2 x 10   GTB 2x10 GRN: 2 x 10   GTB 2*10   Paloff press   2x10 BTB    2*10 GTB b/l  grn 2 x 10   GTB 2x10 GRN: (2) 2 x 10   GTB 2*10   Anti-rotation press               band "pull the sword"  2x10 RTB        RTB 2x10 Red (2) ( PNF lift): 10 x 2 cole  GTB: 20x b/l   Lateral walk  15' x 4 laps Medium TB loop  15' x 4 laps red loop  Red loop: 15 feet x 4 laps Red loop 4 laps at parallel bars Red loop ( heavy) 4 laps  Red loop 4 laps   Sit to stand 10x squat (buttock tap to chair)  2*10  To standard chair: 10 x 2  10x2 2 x 10   2*10 wall suats with blue ball    Cat cow  2*10 2*10 2 x 10   2x10 20 x Standing cat / cow arms braced on knees 20x    cat to glutes on heels    5 sec hold, 10x  5 sec hold, 10x b/l 5* each side   5 sec x 10 , 5 sec x 5 cole  Rev ea direction        Back extension over theraball 2*10 --                           wisdom pose with walkout    b/l 5* each side 10sec hold  10 sec hold  --      Thera Activity              Patient education  Floor xfer            Lifting mechanics              Posture education                                                                                         Modalities

## 2022-09-29 ENCOUNTER — OFFICE VISIT (OUTPATIENT)
Dept: PHYSICAL THERAPY | Facility: CLINIC | Age: 52
End: 2022-09-29
Payer: COMMERCIAL

## 2022-09-29 DIAGNOSIS — M54.50 LUMBAR SPINE PAIN: Primary | ICD-10-CM

## 2022-09-29 DIAGNOSIS — M54.50 ACUTE BILATERAL LOW BACK PAIN WITHOUT SCIATICA: ICD-10-CM

## 2022-09-29 PROCEDURE — 97112 NEUROMUSCULAR REEDUCATION: CPT

## 2022-09-29 PROCEDURE — 97110 THERAPEUTIC EXERCISES: CPT

## 2022-09-29 NOTE — PROGRESS NOTES
PT Re Evaluation/Daily Note     Today's date: 2022  Patient name: Diane Ash  : 1970  MRN: [id number]  Referring provider: Keily Handy MD  Dx: No diagnosis found  Start Time: 153  Stop Time: 161  Total time in clinic (min): 45 minutes      Assessment  Assessment details: Patient is a 49 y o  Female who presents to skilled outpatient PT with referring diagnosis of acute bilateral low back pain without sciatica  Patient presented to clinic with poor posture, lower back pain, and deficits in lower extremity strength  At this point, the patient has low irritability in lower back sxs and has periods with 0/10 pain  Patient is still unable to sit in hard chair for more than 45 minutes without triggering back sxs  Patient continues to exhibit deficits in core strength which she is concerned will impair ability to tolerate upcoming abdomioplasty and cause back pain to return    Patient will continue to benefit from skilled physical therapy in order to develop core and postural strength to support upcoming surgery      Impairments: Abnormal or restricted ROM, Activity intolerance, Impaired balance, Impaired physical strength, Lacks appropriate HEP, Poor posture, Poor body mechanics and Pain with function  Understanding of Dx/Px/POC: Excellent  Prognosis: Good        Plan  Patient would benefit from: Skilled PT  Planned modality interventions: Biofeedback, Cryotherapy, TENS and Thermotherapy: Hydrocollator Packs  Planned therapy interventions: Abdominal trunk stabilization, Balance, Balance/WB training, Breathing training, Body mechanics training, Coordination, Functional ROM exercises and Gait training, strength training, stretching, traction, and taping  Frequency: 2x/wk  Duration in weeks: 8  Plan of Care beginning date: 22  Plan of Care expiration date: 8 weeks - 2022  Treatment plan discussed with: Patient        Goals  Short Term Goals (4 weeks):  all met as of 22  - Patient will be independent in basic HEP 2-3 weeks  - Patient will have 0/10 pain at rest  - Patient will demonstrate >1/3 improvement in MMT grade as applicable  - Patient will be able to stand for an hour without an increase in pain sxs     Long Term Goals (8 weeks): Progressing as of 29  - Patient will be independent in a comprehensive home exercise program  - Patient FOTO score will improve by 10 points  - Patient will self-report >75% improvement in function  - Patient functional goal will be able to sit for two hours in a hard chair without triggering her back sxs        Subjective     History of Present Illness  - Mechanism of injury: Patient sits in a restaurant in a hard chair which will start the pain and usually the pain goes away in a week  Patient finds that sitting in a hard chair for a period of time is often the trigger  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  Hx of weight loss surgery and gall bladder surgery  Patient reports pain is worse in the morning and better in the afternoon   Patient mentioned that a MD had told her at one point her legs were uneven and she should place a lift in one shoe      - Functional limitations: Bending over, picking things up, exercising the way she wants to, getting out of her chair well, cleaning the house, siting in a hard chair, patient is limited in standing due to pain later     - Patient goals: 0/10 pain      Pain  - Current pain ratin/10  - At best pain ratin/10  - At worst pain ratin-6/10  - Location: bilateral lumbar  - Alleviating factors: ice, naproxen, sitting and relaxing     Social Support  - Steps to enter house: 3  - Stairs in house: 13  - Bedroom/bathroom set up:2nd floow  - Lives in: house  - Lives with: fiance and daughter        Objective   LE MMT  - L Hip Flexion: 4+/5                            R Hip Flexion: 4+/5  - L Hip Extension: 4+/5                       R Hip Extension: 4+/5  - L Hip Abduction: 4/5             R Hip Abduction: 4/5  - L Hip Adduction: 4+/5                       R Hip Adduction: 4+/5  - L Hip IR: 5/5                          R Hip IR: 5/5  - L Hip ER: 5/5                                    R Hip ER: 5/5  - L Knee Extension: 4/5                      R Knee Extension: 4+/5  - L Knee Flexion: 4/5               R Knee Flexion: 4+/5  - L Ankle DF: 5/5                                 R Ankle DF: 5/5  - L Ankle PF: 5/5                                 R Ankle PF: 5/5     -Right illiac crest slightly higher than left illiac crest     Movement  % Symptoms   Flexion 90 X 10 - slightly worse   Extension 20 X 10- slightly better   Side  R 100     Side  L 100     Rotation R 100     Rotation L  100        Sensation  - Light touch: intact   - denies saddle anesthesia  Palpation   -decreased tissue extensbility bilateral errectors        Special Testing L R   Flick test  negative negative   Standing flexion test  positive  negative   Prone knee flexion test positive  negative   Supine to long sit test positive  negative   Neurodynamic assessment                        Insurance:  AMA/CMS  CMS Eval/ Re-eval POC expires Al Demar #/ Referral # Total   Visits  Start date  Expiration date Extension  Visit limitation? PT only or  PT+OT?  Co-Insurance   [facility] 8/29 10/24      12                                                                                                                                AUTH #: Date                           Visits  Authed:  Used                             Remaining                                  Precautions: standard  Medical History        Past Medical History:   Diagnosis Date    Abnormal Pap smear of cervix      Anxiety      Binge eating disorder       on wellbutrin    COVID-19 07/15/2022             Date  9/6/22  9/8/22  9/12/22  9/15/22 9/20/22 9/21/22 9/26/22 9/29/22   Visit Number  3/12  4/12  5/12  6/12 7/12 8/12 9/12- FOTO closed 10/12             Manual               P-A mobs               Anterior innominate mobilization    side lying L3 on L4 closing mob gr lll-lV    --       upslip mob on right                               Neuro Re-ed               TrA progression               TA+ march 2x10 2*10 + overhead pulldown BTB  2*10 with overhead pulldown BTB, + 20x w  TA: 5 sec x 10    + march: 20x     + pulldown: blue: 20 x  TA: 5 sec x 10    + march: 20x     + pulldown: blue: 10 x  TA: 5 sec x 10    + march: 20 x    + pulldown: blue: 10 x  20x bridge w TA 20x bridge w TA   TA + heel slides 2x10 20x ea  --                  Hip add squeeze With bridging             Clamshells                Bridge   2x10 with Add Sq  2*10, + 2*10 with march,  with ADD squeeze  2*10, + 2*10 with march, + 2*10 ADD squeeze  + hip add iso: 2 x 10    + march: 2 x 10   + hip add iso: 2 x 10    + march: 2 x 10   + hip add iso: 2 x 10     + march: 2 x 10       Sciatic nerve sliders    supine press forearm into 1/2 foam into thigh b/l 2*10  supine press forearm into 1/2 foam into thigh b/l 2*10 2 x 10   2x10 2 x 10    Ambulation 50' x 4 4# ball out held from body, 50' x 4 to left and right   SEVERO 3'          Hip ABD/ ADD   2*10 red loop Red: 2 x 10   2x10 Red: 2 x 10   Blue loop 2*10 red loop 2*10   Left hip flexion/ knee to chest           Prone press up  2x8    2*10 with therapist overpressure  standard: 10 x   PT overpressure: 10 x 10x ea  standard: 10 x   Manual OP: 10 x  CHARLY 2*10 against counter CHARLY 20x against counter   Thera Ex               CHARLY/REIL    2*10 on plinth           TB shoulder ext  2x10 BTB  2*10 BTB  2*10 GTB  grn 2 x 10   GTB 2x10 GRN: 10 x 2 GTB 2*10 GTB 20x   TB shoulder row  2x10 BTB  2*10 BTB  2*10 GTB  grn 2 x 10   GTB 2x10 GRN: 2 x 10   GTB 2*10 GTB 20x   Paloff press   2x10 BTB    2*10 GTB b/l  grn 2 x 10   GTB 2x10 GRN: (2) 2 x 10   GTB 2*10 GTB 20x   Anti-rotation press                band "pull the sword"  2x10 RTB        RTB 2x10 Red (2) ( PNF lift): 10 x 2 cole  GTB: 20x b/l GTB 20x   Lateral walk  15' x 4 laps Medium TB loop  15' x 4 laps red loop  Red loop: 15 feet x 4 laps Red loop 4 laps at parallel bars Red loop ( heavy) 4 laps  Red loop 4 laps    Sit to stand 10x squat (buttock tap to chair)  2*10  To standard chair: 10 x 2  10x2 2 x 10   2*10 wall suats with blue ball  20x   Cat cow  2*10 2*10 2 x 10   2x10 20 x Standing cat / cow arms braced on knees 20x     cat to glutes on heels    5 sec hold, 10x  5 sec hold, 10x b/l 5* each side   5 sec x 10 , 5 sec x 5 cole  Rev ea direction         Back extension over theraball 2*10 --               Rec bike 8 min               wisdom pose with walkout    b/l 5* each side 10sec hold  10 sec hold  --       Thera Activity               Patient education  Floor xfer             Lifting mechanics               Posture education                                                                                               Modalities

## 2022-10-04 ENCOUNTER — OFFICE VISIT (OUTPATIENT)
Dept: PHYSICAL THERAPY | Facility: CLINIC | Age: 52
End: 2022-10-04
Payer: COMMERCIAL

## 2022-10-04 DIAGNOSIS — M54.50 LUMBAR SPINE PAIN: Primary | ICD-10-CM

## 2022-10-04 DIAGNOSIS — M54.50 ACUTE BILATERAL LOW BACK PAIN WITHOUT SCIATICA: ICD-10-CM

## 2022-10-04 PROCEDURE — 97112 NEUROMUSCULAR REEDUCATION: CPT

## 2022-10-04 PROCEDURE — 97110 THERAPEUTIC EXERCISES: CPT

## 2022-10-04 NOTE — PROGRESS NOTES
Daily Note     Today's date: 10/4/2022  Patient name: Travis Singer  : 1970  MRN: [id number]  Referring provider: Abad Christian MD  Dx:   Encounter Diagnosis     ICD-10-CM    1  Lumbar spine pain  M54 50    2  Acute bilateral low back pain without sciatica  M54 50                   Subjective: Patient reports into clinic with a little bit of residual soreness in lower back which got better when she did her stretches  Objective: See treatment diary below      Assessment: Tolerated treatment well  Patient would benefit from continued PT in order to continue building core and postural muscles to decrease pain sxs  Patient did well with the addition of back extensions and bird dogs on yellow theraball  Continue to progress strengthening with pilates leg extensions with banded pulldown and leg circles  Plan: Continue per plan of care             Insurance:  AMA/CMS  CMS Eval/ Re-eval POC expires Sebas Be #/ Referral # Total   Visits  Start date  Expiration date Extension  Visit limitation? PT only or  PT+OT?  Co-Insurance   [facility] 8/29 10/24      12                                                                                                                                AUTH #: Date                           Visits  Authed:  Used                             Remaining                                  Precautions: standard  Medical History        Past Medical History:   Diagnosis Date    Abnormal Pap smear of cervix      Anxiety      Binge eating disorder       on wellbutrin    COVID-19 07/15/2022             Date 9/20/22 9/21/22 9/26/22 9/29/22 10/4/22   Visit Number - FOTO closed 10/12 11/12            Manual        P-A mobs        Anterior innominate mobilization        upslip mob on right                 Neuro Re-ed        TrA progression        TA+ march TA: 5 sec x 10    + march: 20x     + pulldown: blue: 10 x  TA: 5 sec x 10    + march: 20 x    + pulldown: blue: 10 x  20x bridge w TA 20x bridge w TA 20x bridge with ADD squeeze   TA + heel slides                Hip add squeeze        SLR "rainbow"     20x b/l   Clamshells      sidelying 20x b/l   Bridge  + hip add iso: 2 x 10    + march: 2 x 10   + hip add iso: 2 x 10     + march: 2 x 10     X 20 march   Sciatic nerve sliders 2x10 2 x 10    Ambulation 50' x 4 4# ball out held from body, 50' x 4 to left and right    SEVERO        Hip ABD/ ADD 2x10 Red: 2 x 10   Blue loop 2*10 red loop 2*10 Red loop 2*10   Left hip flexion/ knee to chest        Prone press up 10x ea  standard: 10 x   Manual OP: 10 x  CHARLY 2*10 against counter CHARLY 20x against counter CHARLY 20x against counter   Thera Ex        CHARLY/REIL        TB shoulder ext GTB 2x10 GRN: 10 x 2 GTB 2*10 GTB 20x BTB 20x   TB shoulder row GTB 2x10 GRN: 2 x 10   GTB 2*10 GTB 20x BTB 20x   Paloff press  GTB 2x10 GRN: (2) 2 x 10   GTB 2*10 GTB 20x BTB 20x   Anti-rotation press         band "pull the sword" RTB 2x10 Red (2) ( PNF lift): 10 x 2 cole  GTB: 20x b/l GTB 20x BTB 20x   Lateral walk  Red loop 4 laps at parallel bars Red loop ( heavy) 4 laps  Red loop 4 laps  Red loop 4 laps   Sit to stand 10x2 2 x 10   2*10 wall suats with blue ball  20x    Cat cow 2x10 20 x Standing cat / cow arms braced on knees 20x      cat to glutes on heels Rev ea direction    TRX squat 20x        Back extension on ball 20x   Leg circles     +++   Arm banded, leg extension     +++   Roll up with theraband     +++       Rec bike 8 min Rec bike 6 min lvl 3   Pointer     On theraball     wisdom pose with walkout        Thera Activity        Patient education        Lifting mechanics        Posture education                                                     Modalities

## 2022-10-06 ENCOUNTER — OFFICE VISIT (OUTPATIENT)
Dept: PHYSICAL THERAPY | Facility: CLINIC | Age: 52
End: 2022-10-06
Payer: COMMERCIAL

## 2022-10-06 DIAGNOSIS — M54.50 ACUTE BILATERAL LOW BACK PAIN WITHOUT SCIATICA: ICD-10-CM

## 2022-10-06 DIAGNOSIS — M54.50 LUMBAR SPINE PAIN: Primary | ICD-10-CM

## 2022-10-06 PROCEDURE — 97110 THERAPEUTIC EXERCISES: CPT

## 2022-10-06 PROCEDURE — 97112 NEUROMUSCULAR REEDUCATION: CPT

## 2022-10-06 NOTE — PROGRESS NOTES
PT Discharge/ Daily Note     Today's date: 10/6/2022  Patient name: Darek Mccarty  : 1970  MRN: [id number]  Referring provider: Ramón Womack MD  Dx:   Encounter Diagnosis     ICD-10-CM    1  Lumbar spine pain  M54 50    2  Acute bilateral low back pain without sciatica  M54 50                   Subjective: Patient reports that she has 0/10 pain and has obtained all goals  Patient is very concerned about building core strength before upcoming surgery so that she will not re-experience back pain  Objective: See treatment diary below      Assessment: Tolerated treatment well  Patient would benefit from continued PT in order to strengthen core and postural musculature for upcoming surgery  Patient did well with the addition of single leg bridges  Patient given extensive HEP and bands with instructions to contact [facility] with any future physical therapy concerns or questions  Plan: Continue per plan of care             Insurance:  AMA/CMS  CMS Eval/ Re-eval POC expires Estuardo Conley #/ Referral # Total   Visits  Start date  Expiration date Extension  Visit limitation? PT only or  PT+OT?  Co-Insurance   [facility] 8/29 10/24      12                                                                                                                                AUTH #: Date                           Visits  Authed:  Used                             Remaining                                  Precautions: standard  Medical History        Past Medical History:   Diagnosis Date    Abnormal Pap smear of cervix      Anxiety      Binge eating disorder       on wellbutrin    COVID-19 07/15/2022             Date 9/20/22 9/21/22 9/26/22 9/29/22 10/4/22 10/6/22   Visit Number - FOTO closed 10/12 11/12 12/12             Manual         P-A mobs         Anterior innominate mobilization         upslip mob on right                   Neuro Re-ed         TrA progression         + march TA: 5 sec x 10    + march: 20x     + pulldown: blue: 10 x  TA: 5 sec x 10    + march: 20 x    + pulldown: blue: 10 x  20x bridge w TA 20x bridge w TA 20x bridge with ADD squeeze 20x bridge,  20x single leg bridge   TA + heel slides                  Hip add squeeze         SLR "rainbow"     20x b/l    Clamshells      sidelying 20x b/l    Bridge  + hip add iso: 2 x 10    + march: 2 x 10   + hip add iso: 2 x 10     + march: 2 x 10     X 20 march    Sciatic nerve sliders 2x10 2 x 10    Ambulation 50' x 4 4# ball out held from body, 50' x 4 to left and right     SEVERO         Hip ABD/ ADD 2x10 Red: 2 x 10   Blue loop 2*10 red loop 2*10 Red loop 2*10 Blue loop 2*10   Left hip flexion/ knee to chest         Prone press up 10x ea  standard: 10 x   Manual OP: 10 x  CHARLY 2*10 against counter CHARLY 20x against counter CHARLY 20x against counter CHARLY at counter 20x   Thera Ex         CHARLY/REIL         TB shoulder ext GTB 2x10 GRN: 10 x 2 GTB 2*10 GTB 20x BTB 20x BTB 20x   TB shoulder row GTB 2x10 GRN: 2 x 10   GTB 2*10 GTB 20x BTB 20x BTB 20x   Paloff press  GTB 2x10 GRN: (2) 2 x 10   GTB 2*10 GTB 20x BTB 20x BTB 20x   Anti-rotation press          band "pull the sword" RTB 2x10 Red (2) ( PNF lift): 10 x 2 cole  GTB: 20x b/l GTB 20x BTB 20x BTB 20x   Lateral walk  Red loop 4 laps at parallel bars Red loop ( heavy) 4 laps  Red loop 4 laps  Red loop 4 laps Red loop 4 laps   Sit to stand 10x2 2 x 10   2*10 wall suats with blue ball  20x     Cat cow 2x10 20 x Standing cat / cow arms braced on knees 20x       cat to glutes on heels Rev ea direction    TRX squat 20x Mini squat at bar 20x        Back extension on ball 20x    Leg circles     +++    Arm banded, leg extension     +++    Roll up with theraband     +++        Rec bike 8 min Rec bike 6 min lvl 3 Rev bike 8 min lvl 3   Pointer     On theraball      wisdom pose with walkout         Thera Activity         Patient education         Lifting mechanics         Posture education                                                   Modalities                       Access Code: [email or web address]  URL: [email or web address]  Date: 10/06/2022  Prepared by: Laura Marino    Exercises  · Shoulder extension with resistance - Neutral - 1 x daily - 7 x weekly - 3 sets - 10 reps  · Standing Row with Resistance with Anchored Resistance at Chest Height Palms Down - 1 x daily - 7 x weekly - 3 sets - 10 reps  · Single Leg Bridge - 1 x daily - 7 x weekly - 3 sets - 10 reps  · Supine Bridge with Resistance Band - 1 x daily - 7 x weekly - 3 sets - 10 reps  · Marching Bridge - 1 x daily - 7 x weekly - 3 sets - 10 reps  · Side Stepping with Resistance at Feet - 1 x daily - 7 x weekly - 3 sets - 10 reps  · Standing Hip Extension with Leg Bent and Support - 1 x daily - 7 x weekly - 3 sets - 10 reps  · Standing Hip Abduction with Resistance on Sound Leg (BKA) - 1 x daily - 7 x weekly - 3 sets - 10 reps  · Standing Shoulder Single Arm PNF D2 Flexion with Anchored Resistance - 1 x daily - 7 x weekly - 3 sets - 10 reps

## 2022-10-17 DIAGNOSIS — F41.9 ANXIETY: ICD-10-CM

## 2022-10-17 RX ORDER — BUPROPION HYDROCHLORIDE 300 MG/1
300 TABLET ORAL DAILY
Qty: 90 TABLET | Refills: 1 | Status: SHIPPED | OUTPATIENT
Start: 2022-10-17

## 2022-10-26 ENCOUNTER — PROCEDURE VISIT (OUTPATIENT)
Dept: OBGYN CLINIC | Facility: CLINIC | Age: 52
End: 2022-10-26
Payer: COMMERCIAL

## 2022-10-26 VITALS — DIASTOLIC BLOOD PRESSURE: 60 MMHG | BODY MASS INDEX: 23.87 KG/M2 | WEIGHT: 138 LBS | SYSTOLIC BLOOD PRESSURE: 100 MMHG

## 2022-10-26 DIAGNOSIS — N93.9 ABNORMAL UTERINE BLEEDING: Primary | ICD-10-CM

## 2022-10-26 PROCEDURE — 99213 OFFICE O/P EST LOW 20 MIN: CPT | Performed by: NURSE PRACTITIONER

## 2022-10-26 PROCEDURE — 58100 BIOPSY OF UTERUS LINING: CPT | Performed by: NURSE PRACTITIONER

## 2022-10-26 NOTE — PROGRESS NOTES
Assessment/Plan:    Abnormal uterine bleeding  Discussed with patient bleeding may have been return of menses vs post-menopausal bleeding  Discussed option of continuing to monitor vs endometrial biopsy  Endometrial biopsy procedure reviewed with patient, risks, benefits and alternatives discussed  She would like to proceed with biopsy    Endometrial biopsy attempted, unsuccessful due to cervical stenosis  Rx for pelvic ultrasound, will call with follow up recommendations  If recommended for endometrial biopsy will given cytotec then re-attempt biopsy in office  Diagnoses and all orders for this visit:    Abnormal uterine bleeding  -     US pelvis complete w transvaginal; Future  -     Endometrial biopsy          Subjective:      Patient ID: Berhane Cortez is a 47 y o  female  Pt states she had a menses about two weeks ago  Felt breast tenderness and thought her period was coming  She denies any cramping    Went to restroom and noted bright red bleeding  She had a light/moderate flow that lasted 5-6 days  Denies any pain during the bleeding  She has since have intermittent spotting since  Yesterday was a heavier spotting, and today a lighter spotting  Pt states she usually has hot flashes and noticed they were better recently  Has been having hot flashes for many years  Hx of endometrial ablation about 5-6 years ago for heavy menses  Thinks her last menses was 4 5 yrs ago, which stopped after weight loss surgery  Denies any hx of HRT  The following portions of the patient's history were reviewed and updated as appropriate: allergies, current medications, past family history, past medical history, past social history, past surgical history and problem list     Review of Systems   Genitourinary: Positive for vaginal bleeding  Objective:    /60   Wt 62 6 kg (138 lb)   BMI 23 87 kg/m²      Physical Exam  Vitals and nursing note reviewed     Constitutional: Appearance: She is well-developed  HENT:      Head: Normocephalic and atraumatic  Neck:      Thyroid: No thyromegaly  Cardiovascular:      Rate and Rhythm: Normal rate and regular rhythm  Heart sounds: Normal heart sounds  Pulmonary:      Effort: Pulmonary effort is normal       Breath sounds: Normal breath sounds  Abdominal:      General: Bowel sounds are normal  There is no distension  Palpations: Abdomen is soft  There is no mass  Tenderness: There is no abdominal tenderness  There is no guarding or rebound  Genitourinary:     Labia:         Right: No rash, tenderness, lesion or injury  Left: No rash, tenderness, lesion or injury  Vagina: Normal       Cervix: Normal    Musculoskeletal:         General: Normal range of motion  Cervical back: Normal range of motion and neck supple  Skin:     General: Skin is warm and dry  Neurological:      Mental Status: She is alert and oriented to person, place, and time  Psychiatric:         Behavior: Behavior normal          Thought Content: Thought content normal          Judgment: Judgment normal          Endometrial biopsy    Date/Time: 10/26/2022 4:50 PM  Performed by: Duane Drum, CRNP  Authorized by: Danna Child MD   Universal Protocol:  Consent: Verbal consent obtained  Risks and benefits: risks, benefits and alternatives were discussed  Consent given by: patient  Time out: Immediately prior to procedure a "time out" was called to verify the correct patient, procedure, equipment, support staff and site/side marked as required    Timeout called at: 10/26/2022 4:50 PM   Patient understanding: patient states understanding of the procedure being performed  Patient consent: the patient's understanding of the procedure matches consent given  Procedure consent: procedure consent matches procedure scheduled  Relevant documents: relevant documents present and verified  Test results: test results available and properly labeled  Site marked: the operative site was not marked  Radiology Images displayed and confirmed  If images not available, report reviewed: imaging studies not available  Required items: required blood products, implants, devices, and special equipment available  Patient identity confirmed: verbally with patient      Indication:     Indications: Other disorder of menstruation and other abnormal bleeding from female genital tract    Pre-procedure:     Premeds:  Ibuprofen  Procedure:     Procedure: endometrial biopsy with Pipelle      A bivalve speculum was placed in the vagina: yes      Cervix cleaned and prepped: yes      A paracervical block was performed: no      An intracervical block was performed: no      The cervix was dilated: no      Uterus sounded: no      Unable to perform due to: cervical stenosis    Findings:     Cervix: stenotic      Adnexa: normal    Comments:     Procedure comments:  No intercourse, tampon use, soaking in bath tub, or swimming for the next 3 days to avoid risk of infection  Call office with excessive bleeding, cramping, fever, or chills  Rx for pelvic ultrasound given

## 2022-10-26 NOTE — ASSESSMENT & PLAN NOTE
Discussed with patient bleeding may have been return of menses vs post-menopausal bleeding  Discussed option of continuing to monitor vs endometrial biopsy  Endometrial biopsy procedure reviewed with patient, risks, benefits and alternatives discussed  She would like to proceed with biopsy    Endometrial biopsy attempted, unsuccessful due to cervical stenosis  Rx for pelvic ultrasound, will call with follow up recommendations  If recommended for endometrial biopsy will given cytotec then re-attempt biopsy in office

## 2022-10-27 ENCOUNTER — TELEPHONE (OUTPATIENT)
Dept: PSYCHIATRY | Facility: CLINIC | Age: 52
End: 2022-10-27

## 2022-10-27 NOTE — TELEPHONE ENCOUNTER
left message for pt to return call to intake in regards to scheduling services in [facility]

## 2022-10-28 ENCOUNTER — HOSPITAL ENCOUNTER (OUTPATIENT)
Dept: RADIOLOGY | Facility: HOSPITAL | Age: 52
Discharge: HOME | End: 2022-10-28
Payer: COMMERCIAL

## 2022-10-28 DIAGNOSIS — N93.9 ABNORMAL UTERINE BLEEDING: ICD-10-CM

## 2022-10-28 PROCEDURE — 76856 US EXAM PELVIC COMPLETE: CPT

## 2022-10-28 PROCEDURE — 76830 TRANSVAGINAL US NON-OB: CPT

## 2022-10-31 ENCOUNTER — TELEPHONE (OUTPATIENT)
Dept: OBGYN CLINIC | Facility: CLINIC | Age: 52
End: 2022-10-31

## 2022-10-31 DIAGNOSIS — R93.89 THICKENED ENDOMETRIUM: Primary | ICD-10-CM

## 2022-10-31 DIAGNOSIS — N93.9 ABNORMAL UTERINE BLEEDING: ICD-10-CM

## 2022-10-31 NOTE — TELEPHONE ENCOUNTER
Pt called stating she would like to review recent pelvic US results as she has seen them in mychart  Can you please review and advise

## 2022-10-31 NOTE — TELEPHONE ENCOUNTER
Padilla Connor for patient to call back regarding her results   MM CMA based on results recommendation is to go for MRI due to unsuccessful try an endometrial sampling advised patient to call office    REA QUEZADA

## 2022-10-31 NOTE — TELEPHONE ENCOUNTER
Spoke with patient informed of US results and recommendation to have MRI of pelvis due to unsuccessful endometrial sampling   MRI ordered patient will call to schedule appointment and follow up based on results , also informed based on results she may need to postpone surgery she has planned in December for abdominoplasty   MM CMA

## 2022-11-03 ENCOUNTER — TELEPHONE (OUTPATIENT)
Dept: GASTROENTEROLOGY | Facility: CLINIC | Age: 52
End: 2022-11-03

## 2022-11-03 NOTE — TELEPHONE ENCOUNTER
Spoke to pt confirming pt's colonoscopy scheduled on 11/10/22 at [facility] with Dr Cooper Barrow  Informed [facility] would be calling the day prior with the arrival time  Informed of clear liquid diet day prior as well as the bowel cleansing preparation  Informed would need a  the day of the procedure due to being under sedation  Pt has instructions and did not have any questions  Advised pt to contact insurance if has any questions regarding coverage for procedure

## 2022-11-08 ENCOUNTER — NURSE TRIAGE (OUTPATIENT)
Dept: OTHER | Facility: OTHER | Age: 52
End: 2022-11-08

## 2022-11-08 ENCOUNTER — OFFICE VISIT (OUTPATIENT)
Dept: OBGYN CLINIC | Facility: CLINIC | Age: 52
End: 2022-11-08

## 2022-11-08 ENCOUNTER — APPOINTMENT (OUTPATIENT)
Dept: LAB | Facility: CLINIC | Age: 52
End: 2022-11-08

## 2022-11-08 ENCOUNTER — OFFICE VISIT (OUTPATIENT)
Dept: LAB | Facility: CLINIC | Age: 52
End: 2022-11-08

## 2022-11-08 ENCOUNTER — PREP FOR PROCEDURE (OUTPATIENT)
Dept: GYNECOLOGY | Facility: CLINIC | Age: 52
End: 2022-11-08

## 2022-11-08 VITALS
WEIGHT: 137 LBS | BODY MASS INDEX: 24.27 KG/M2 | HEIGHT: 63 IN | DIASTOLIC BLOOD PRESSURE: 78 MMHG | SYSTOLIC BLOOD PRESSURE: 128 MMHG

## 2022-11-08 DIAGNOSIS — R94.31 ABNORMAL EKG: ICD-10-CM

## 2022-11-08 DIAGNOSIS — Z01.818 PREOPERATIVE EXAM FOR GYNECOLOGIC SURGERY: Primary | ICD-10-CM

## 2022-11-08 DIAGNOSIS — N93.9 ABNORMAL UTERINE BLEEDING: Primary | ICD-10-CM

## 2022-11-08 DIAGNOSIS — N93.9 ABNORMAL UTERINE BLEEDING: ICD-10-CM

## 2022-11-08 NOTE — PROGRESS NOTES
Assessment/Plan:  Sonia Gutierrez is a 47 y o  [id number] with AUB who presents for discussion of evaluation options     1  Preoperative exam for gynecologic surgery  Type and screen   2  Abnormal EKG  ECG 12 lead        • Extensively reviewed should have endometrial sampling, given cervical stenosis on endometrial biopsy attempt, recommend sampling with hysteroscopy  Extensively reviewed risks and benefits of hysteroscopy, dilation and curettage  Reviewed procedure in detail and risks including but not limited to VTE/stroke/cardiac arrest, bleeding that may require transfusion, infection, uterine perforation, injury to surrounding structures including blood vessels, nerves, bowel or bladder that may require additional procedures or surgeries  Reviewed possibility of diagnostic laparoscopy or open incision if any unanticipated difficulty or complications  Discussed need for repeat EKG given hx of abnormal EKG and need for medical clearance  • Surgical consent reviewed and signed  Provided surgical booklet and wash provided  Reviewed need to fast after midnight (or at least 8 hours) prior to surgery  Reviewed need for ride after surgery given under anesthesia for surgery  Needs EKG and PCP clearance  • All questions answered to the best of my ability     Subjective:      Patient ID: Sonia Gutierrez is a 47 y o  female      HPI     Endometrial ablation approx 5 years ago  Had periods q 3 months until gastric bypass, then no periods  Had symptoms of menopause--hot flashes, menopause  Then last month had a period and hot flashes stopped  Last month had a normal periods about 5 days, moderate  No bleeding last couple of day    Prior surgeries: lsc gallbladder, gastric sleeve, lsc salpingectomy    Has surgery scheduled 12/15/22: tummy tuck    The following portions of the patient's history were reviewed and updated as appropriate: She  has a past medical history of Abnormal Pap smear of cervix, Anxiety, Binge eating disorder, COVID-19 (07/15/2022), Fibroid, Genital warts, and Miscarriage  Current Outpatient Medications on File Prior to Visit   Medication Sig   • B Complex Vitamins (VITAMIN B COMPLEX PO) Take by mouth every other day     • buPROPion (WELLBUTRIN XL) 300 mg 24 hr tablet Take 1 tablet (300 mg total) by mouth daily   • clobetasol (TEMOVATE) 0 05 % cream Apply 5 g (1 application total) topically 2 (two) times a day for 14 days, THEN 5 g (1 application total) 3 (three) times a week  • Collagen-Boron-Hyaluronic Acid (Move Free SensibleSelf) 40-5-3 3 MG TABS Take by mouth   • LORazepam (ATIVAN) 1 mg tablet TAKE 1 TABLET BY MOUTH EVERY 8 HOURS AS NEEDED FOR ANXIETY   • multivitamin (THERAGRAN) TABS Take 1 tablet by mouth daily   • vitamin A 2400 MCG (8000 UT) capsule Take 8,000 Units by mouth daily   • [DISCONTINUED] cyclobenzaprine (FLEXERIL) 10 mg tablet Take 1 tablet (10 mg total) by mouth 3 (three) times a day as needed for muscle spasms   • [DISCONTINUED] naproxen (NAPROSYN) 500 mg tablet Take 1 tablet (500 mg total) by mouth 2 (two) times a day with meals for 10 days     No current facility-administered medications on file prior to visit  She has No Known Allergies       Review of Systems   Constitutional: Negative for unexpected weight change  HENT: Negative for trouble swallowing  Respiratory: Negative for shortness of breath  Cardiovascular: Negative for chest pain  Gastrointestinal: Negative for abdominal pain  Genitourinary: Positive for menstrual problem and vaginal bleeding  Negative for pelvic pain  Musculoskeletal:        Mild-mod back pain (in PT)   Neurological: Negative for seizures and headaches  Objective:  /78 (BP Location: Left arm, Patient Position: Sitting, Cuff Size: Standard)   Ht 5' 3" (1 6 m)   Wt 62 1 kg (137 lb)   Breastfeeding Yes   BMI 24 27 kg/m²      Physical Exam  Constitutional:       Appearance: Normal appearance     HENT:      Head: Normocephalic  Eyes:      Extraocular Movements: Extraocular movements intact  Conjunctiva/sclera: Conjunctivae normal    Pulmonary:      Effort: Pulmonary effort is normal    Abdominal:      General: There is no distension  Palpations: Abdomen is soft  Tenderness: There is no abdominal tenderness  Comments: Well-healed surgical scars   Genitourinary:     Comments: def  Musculoskeletal:         General: Normal range of motion  Cervical back: Normal range of motion  Skin:     General: Skin is warm  Neurological:      General: No focal deficit present  Mental Status: She is alert  Psychiatric:         Mood and Affect: Mood normal          Behavior: Behavior normal          Thought Content: Thought content normal            PELVIC ULTRASOUND, COMPLETE     INDICATION:  The patient is 47years old  N93 9: Abnormal uterine and vaginal bleeding, unspecified  Status post endometrial ablation 5 years prior      COMPARISON: None     TECHNIQUE:   Transabdominal pelvic ultrasound was performed in sagittal and transverse planes with a curvilinear transducer  Additional transvaginal imaging was performed to better evaluate the endometrium and ovaries  Imaging included volumetric   sweeps as well as traditional still imaging technique      FINDINGS:     UTERUS:  The uterus is anteverted in position, measuring 8 0 x 4 6 x 5 0 cm  The myometrium is heterogeneous which may be secondary to adenomyosis  The cervix appears within normal limits      ENDOMETRIUM:    Endometrium is distended with complex fluid, likely blood products  Glandular thickness is difficult to determine as borders are ill-defined  This may be secondary to prior ablation or adjacent adenomyosis    Along the posterior right fundal aspect of   the endometrium there is a lobulated focus of echogenicity measuring 1 9 x 1 2 x 1 0 cm with vascularity      OVARIES/ADNEXA:  Right ovary:  1 7 x 2 4 x 1 1 cm  2 3 mL  No suspicious right ovarian abnormality  Doppler flow within normal limits      Left ovary:  1 8 x 1 5 x 1 4 cm  2 0 mL  No suspicious left ovarian abnormality  Doppler flow within normal limits      No suspicious adnexal mass or loculated collections  There is no free fluid      IMPRESSION:   Endometrium distended with complex fluid, likely blood products  While glandular thickness is difficult to discretely identify separate from the adjacent myometrium, there is a focal area of lobulated echogenicity along the right fundal portion of the   endometrium posteriorly which demonstrates vascularity and could represent neoplasm  Sampling is recommended if possible versus further evaluation with MRI

## 2022-11-08 NOTE — PATIENT INSTRUCTIONS
Hysteroscopy   AMBULATORY CARE:   What you need to know about a hysteroscopy:  A hysteroscopy is a procedure to find and treat problems in your uterus  A hysteroscopy may be done to find, and possibly treat, the cause of abnormal vaginal bleeding, problems getting pregnant, or miscarriage  It may also be done to insert or remove a device that prevents pregnancy  How to prepare for a hysteroscopy: Your procedure may be done at your healthcare provider's office, an outpatient facility, or a hospital  Arrange for someone to drive you home after your procedure  Your healthcare provider will talk to you about how to prepare  You may be told not to eat or drink anything after midnight on the day of your procedure  You may need to stop taking blood thinners or aspirin several days before your procedure  This will help decrease your risk for bleeding  Your provider will tell you what medicines to take or not take on the day of your procedure  You may be told to take ibuprofen on the day of your procedure to control pain  You may be given an antibiotic to prevent infection  Tell healthcare providers if you have ever had an allergic reaction to an antibiotic  Your healthcare provider may put medicine on your cervix before your procedure  This will help open your cervix so the scope can be inserted into your uterus more easily  A scope is a small tube with a light and a camera on the end  What will happen during a hysteroscopy: You may be given general anesthesia to keep you asleep and free from pain  You may instead be given medicine to help you relax, and local or spinal anesthesia  With local or spinal anesthesia, you may still feel pressure or pushing, but you should not feel any pain  Your healthcare provider will gently insert a device into your vagina  The device opens the walls of your vagina so your healthcare provider can see your cervix  Tools or medicine will be used to open your cervix  Your provider will insert the scope through your cervix and into your uterus  Your provider may inject air or fluid to help see inside your uterus more clearly  Tools inserted through the scope may be used to remove scar tissue, growths such as polyps or fibroids, or a sample of tissue  Tools may also be used to control bleeding  A vaginal pack or sanitary pad may be used to absorb the bleeding  A vaginal pack is a special gauze that is inserted into the vagina  It will be removed before you go home  What will happen after a hysteroscopy:  Healthcare providers will monitor you until you are awake  You may be able to go home, or you may need to spend a night in the hospital  It is normal to have vaginal bleeding and cramps for 2 to 3 days after your procedure  Your bleeding may range from barely staining a pad to soaking a pad every 2 to 3 hours  You may see blood clots on the pad  Call your healthcare provider if you see blood clots that are larger than the size of a quarter  Risks of a hysteroscopy: You may get an infection or bleed more than expected  Scar tissue may form in your uterus  This may make it difficult to get pregnant  You may have an allergic reaction to the fluid injected into your uterus  The fluid may build up and cause problems in your lungs, heart, or brain  This may become life-threatening  The scope or tools may make a hole in your cervix, uterus, bowels, or bladder  This may require more surgery to fix and may become life-threatening  Call 911 if:   You have trouble breathing  Seek care immediately if:   You have heavy vaginal bleeding that fills 1 or more sanitary pads in 1 hour  You have severe pain or bloating in your abdomen  You feel lightheaded, weak, and confused  You see blood in your urine  You stop urinating or urinate less than usual     Contact your healthcare provider if:   You have a fever or chills      You have pus or a foul-smelling odor coming from your vagina  You see blood clots on your sanitary pad that are larger than the size of a quarter  You have nausea or are vomiting  Your skin is itchy, swollen, or you have a rash  You have questions or concerns about your condition or care  Medicines: You may need any of the following:  Estrogen  helps heal the lining of your uterus  Antibiotics  help prevent a bacterial infection  Prescription pain medicine  may be given  Ask your healthcare provider how to take this medicine safely  Some prescription pain medicines contain acetaminophen  Do not take other medicines that contain acetaminophen without talking to your healthcare provider  Too much acetaminophen may cause liver damage  Prescription pain medicine may cause constipation  Ask your healthcare provider how to prevent or treat constipation  NSAIDs , such as ibuprofen, help decrease swelling, pain, and fever  NSAIDs can cause stomach bleeding or kidney problems in certain people  If you take blood thinner medicine, always ask your healthcare provider if NSAIDs are safe for you  Always read the medicine label and follow directions  Take your medicine as directed  Contact your healthcare provider if you think your medicine is not helping or if you have side effects  Tell him or her if you are allergic to any medicine  Keep a list of the medicines, vitamins, and herbs you take  Include the amounts, and when and why you take them  Bring the list or the pill bottles to follow-up visits  Carry your medicine list with you in case of an emergency  Activity:  Do not have sex, use tampons, or douche for up to 6 weeks  These actions may cause an infection  Ask your healthcare provider if it is okay to take a tub bath or swim  Rest as needed  Do not drive, return to work, or exercise for 24 hours or as directed  You can return to most activities in 1 to 2 days     Follow up with your doctor as directed:  Write down your questions so you remember to ask them during your visits  © Copyright [employer] 2022 Information is for End User's use only and may not be sold, redistributed or otherwise used for commercial purposes  All illustrations and images included in CareNotes® are the copyrighted property of [employer] Inc  or Janeth Foy  The above information is an  only  It is not intended as medical advice for individual conditions or treatments  Talk to your doctor, nurse or pharmacist before following any medical regimen to see if it is safe and effective for you

## 2022-11-08 NOTE — TELEPHONE ENCOUNTER
Regarding: Pre Procedure Question  ----- Message from Chapo Cheng sent at 11/8/2022  4:38 PM EST -----  "I am scheduled for a colonoscopy on Thursday, I just ate an orange cranberry muffin   I'm calling to see what they think about that "

## 2022-11-08 NOTE — TELEPHONE ENCOUNTER
Patient called with concern of eating a cranberry and orange muffin today when her colonoscopy is scheduled for Thursday 11/10/2022  Patient is requesting a call back regarding if she is okay to proceed with bowel prep and procedure

## 2022-11-08 NOTE — TELEPHONE ENCOUNTER
Reason for Disposition  • [1] Caller has NON-URGENT question AND [2] triager unable to answer question    Answer Assessment - Initial Assessment Questions  1  DATE/TIME: "When did you have your colonoscopy?"       Upcoming on Thursday   2  MAIN CONCERN: "What is your main concern right now?" "What questions do you have?"  I ate a orange cranberry muffin and I want to make sure it is still okay   3  ABDOMEN PAIN: "Are you having any abdomen (belly or stomach) pain?" If Yes, ask: "How bad is it?" (e g , Scale 1-10; mild, moderate, severe)  - MILD (1-3): doesn't interfere with normal activities, abdomen soft and not tender to touch      - MODERATE (4-7): interferes with normal activities or awakens from sleep, tender to touch      - SEVERE (8-10): excruciating pain, doubled over, unable to do any normal activities     Denies   4  OTHER SYMPTOMS: "What other symptoms are you having?" (e g , rectal bleeding, bloating or feeling gassy, passing gas, vomiting, dizziness, fever)  N/A  5  ONSET: "When did your symptoms start?"  N/A  6   PATTERN: "Is the symptom(s) constant or does it come and go?" "Is your symptom(s) getting worse, better, or staying the same?"  N/A    Protocols used: COLONOSCOPY SYMPTOMS AND QUESTIONS-ADULT-AH

## 2022-11-09 LAB
ABO GROUP BLD: NORMAL
ATRIAL RATE: 54 BPM
BLD GP AB SCN SERPL QL: NEGATIVE
P AXIS: 45 DEGREES
PR INTERVAL: 142 MS
QRS AXIS: 43 DEGREES
QRSD INTERVAL: 72 MS
QT INTERVAL: 464 MS
QTC INTERVAL: 440 MS
RH BLD: POSITIVE
SPECIMEN EXPIRATION DATE: NORMAL
T WAVE AXIS: 66 DEGREES
VENTRICULAR RATE: 54 BPM

## 2022-11-10 ENCOUNTER — HOSPITAL ENCOUNTER (OUTPATIENT)
Dept: GASTROENTEROLOGY | Facility: AMBULARY SURGERY CENTER | Age: 52
Setting detail: OUTPATIENT SURGERY
End: 2022-11-10
Attending: INTERNAL MEDICINE

## 2022-11-10 ENCOUNTER — ANESTHESIA EVENT (OUTPATIENT)
Dept: GASTROENTEROLOGY | Facility: AMBULARY SURGERY CENTER | Age: 52
End: 2022-11-10

## 2022-11-10 ENCOUNTER — ANESTHESIA (OUTPATIENT)
Dept: GASTROENTEROLOGY | Facility: AMBULARY SURGERY CENTER | Age: 52
End: 2022-11-10

## 2022-11-10 ENCOUNTER — OFFICE VISIT (OUTPATIENT)
Dept: FAMILY MEDICINE CLINIC | Facility: CLINIC | Age: 52
End: 2022-11-10

## 2022-11-10 VITALS
OXYGEN SATURATION: 100 % | RESPIRATION RATE: 18 BRPM | HEART RATE: 60 BPM | SYSTOLIC BLOOD PRESSURE: 107 MMHG | DIASTOLIC BLOOD PRESSURE: 69 MMHG | TEMPERATURE: 97.6 F

## 2022-11-10 VITALS
BODY MASS INDEX: 23.74 KG/M2 | TEMPERATURE: 97.5 F | HEIGHT: 63 IN | OXYGEN SATURATION: 97 % | RESPIRATION RATE: 16 BRPM | WEIGHT: 134 LBS | DIASTOLIC BLOOD PRESSURE: 80 MMHG | HEART RATE: 85 BPM | SYSTOLIC BLOOD PRESSURE: 110 MMHG

## 2022-11-10 DIAGNOSIS — F41.9 ANXIETY: ICD-10-CM

## 2022-11-10 DIAGNOSIS — N93.9 ABNORMAL UTERINE BLEEDING: ICD-10-CM

## 2022-11-10 DIAGNOSIS — Z01.818 PREOP EXAMINATION: Primary | ICD-10-CM

## 2022-11-10 DIAGNOSIS — R00.1 SINUS BRADYCARDIA: ICD-10-CM

## 2022-11-10 DIAGNOSIS — Z12.11 SCREENING FOR COLON CANCER: ICD-10-CM

## 2022-11-10 DIAGNOSIS — Z90.3 S/P PARTIAL GASTRECTOMY: ICD-10-CM

## 2022-11-10 PROBLEM — Z98.51 S/P TUBAL LIGATION: Status: ACTIVE | Noted: 2022-11-10

## 2022-11-10 RX ORDER — LIDOCAINE HYDROCHLORIDE 10 MG/ML
INJECTION, SOLUTION EPIDURAL; INFILTRATION; INTRACAUDAL; PERINEURAL AS NEEDED
Status: DISCONTINUED | OUTPATIENT
Start: 2022-11-10 | End: 2022-11-10

## 2022-11-10 RX ORDER — PROPOFOL 10 MG/ML
INJECTION, EMULSION INTRAVENOUS CONTINUOUS PRN
Status: DISCONTINUED | OUTPATIENT
Start: 2022-11-10 | End: 2022-11-10

## 2022-11-10 RX ORDER — SODIUM CHLORIDE, SODIUM LACTATE, POTASSIUM CHLORIDE, CALCIUM CHLORIDE 600; 310; 30; 20 MG/100ML; MG/100ML; MG/100ML; MG/100ML
75 INJECTION, SOLUTION INTRAVENOUS CONTINUOUS
Status: DISCONTINUED | OUTPATIENT
Start: 2022-11-10 | End: 2022-11-14 | Stop reason: HOSPADM

## 2022-11-10 RX ORDER — PROPOFOL 10 MG/ML
INJECTION, EMULSION INTRAVENOUS AS NEEDED
Status: DISCONTINUED | OUTPATIENT
Start: 2022-11-10 | End: 2022-11-10

## 2022-11-10 RX ORDER — SODIUM CHLORIDE, SODIUM LACTATE, POTASSIUM CHLORIDE, CALCIUM CHLORIDE 600; 310; 30; 20 MG/100ML; MG/100ML; MG/100ML; MG/100ML
INJECTION, SOLUTION INTRAVENOUS CONTINUOUS PRN
Status: DISCONTINUED | OUTPATIENT
Start: 2022-11-10 | End: 2022-11-10

## 2022-11-10 RX ADMIN — SODIUM CHLORIDE, SODIUM LACTATE, POTASSIUM CHLORIDE, AND CALCIUM CHLORIDE: .6; .31; .03; .02 INJECTION, SOLUTION INTRAVENOUS at 07:28

## 2022-11-10 RX ADMIN — PROPOFOL 100 MCG/KG/MIN: 10 INJECTION, EMULSION INTRAVENOUS at 07:34

## 2022-11-10 RX ADMIN — PROPOFOL 30 MG: 10 INJECTION, EMULSION INTRAVENOUS at 07:44

## 2022-11-10 RX ADMIN — LIDOCAINE HYDROCHLORIDE 50 MG: 10 INJECTION, SOLUTION EPIDURAL; INFILTRATION; INTRACAUDAL; PERINEURAL at 07:34

## 2022-11-10 RX ADMIN — SODIUM CHLORIDE, SODIUM LACTATE, POTASSIUM CHLORIDE, AND CALCIUM CHLORIDE 75 ML/HR: .6; .31; .03; .02 INJECTION, SOLUTION INTRAVENOUS at 07:04

## 2022-11-10 RX ADMIN — PROPOFOL 80 MG: 10 INJECTION, EMULSION INTRAVENOUS at 07:34

## 2022-11-10 NOTE — PROGRESS NOTES
FAMILY PRACTICE PRE-OPERATIVE EVALUATION  [facility]    NAME: Eliza Limon  AGE: 47 y o  SEX: female  : 1970     DATE: 11/10/2022    Family Practice Pre-Operative Evaluation      Chief Complaint: Pre-operative Evaluation     Surgery: *DILATATION AND CURETTAGE (D&C) WITH HYSTEROSCOPY, POSSIBLE POLYPECTOMY (N/A Uterus)  Anticipated Date of Surgery: 2022  Surgeon: TATA Pugh     History of Present Illness:     HPI  Patient is here to medical clearance before upcoming surgery  Had uterine ablation about 5 years ago and started with vaginal bleeding and scheduled for hysteroscopy and possible D&C  Denies any chest pain, sob, headache and dizziness  Anesthesia:  general  Bleeding Risk: no recent abnormal bleeding, no remote history of abnormal bleeding and no use of Ca-channel blockers  Current Anti-platelet/anticoagulation medication: none    Assessment of Cardiac Risk:  Denies unstable or severe angina or MI in the last 6 weeks or history of stent placement in the last year   Denies decompensated heart failure (e g  New onset heart failure, NYHA functional class IV heart failure, or worsening existing heart failure)  Denies significant arrhythmias such as high grade AV block, symptomatic ventricular arrhythmia, newly recognized ventricular tachycardia, supraventricular tachycardia with resting heart rate >100, or symptomatic bradycardia  Denies severe heart valve disease including aortic stenosis or symptomatic mitral stenosis     Exercise Capacity:  Able to walk 4 blocks without symptoms?: Yes  Able to walk 2 flights without symptoms?: Yes    Prior Anesthesia Reactions: No     Personal history of venous thromboembolic disease? No    History of steroid use for >2 weeks within last year? No         Review of Systems:     Review of Systems   Constitutional: Negative  HENT: Negative  Respiratory: Negative  Cardiovascular: Negative      Gastrointestinal: Negative  Genitourinary:        As noted in HPI'     Skin: Negative  Neurological: Negative  Psychiatric/Behavioral: Negative  Current Problem List:     Patient Active Problem List   Diagnosis   • Anal fissure   • Binge eating disorder   • History of cervical dysplasia   • S/P partial gastrectomy   • Submucous leiomyoma of uterus   • Lichen sclerosus of vulva   • Abnormal uterine bleeding   • Anxiety   • S/P tubal ligation   • Slow heartbeat       Allergies:     No Known Allergies    Current Medications:       Current Outpatient Medications:   •  B Complex Vitamins (VITAMIN B COMPLEX PO), Take by mouth every other day  , Disp: , Rfl:   •  buPROPion (WELLBUTRIN XL) 300 mg 24 hr tablet, Take 1 tablet (300 mg total) by mouth daily, Disp: 90 tablet, Rfl: 1  •  clobetasol (TEMOVATE) 0 05 % cream, Apply 5 g (1 application total) topically 2 (two) times a day for 14 days, THEN 5 g (1 application total) 3 (three) times a week , Disp: 60 g, Rfl: 3  •  Collagen-Boron-Hyaluronic Acid ([facility] [facility]) 40-5-3 3 MG TABS, Take by mouth, Disp: , Rfl:   •  LORazepam (ATIVAN) 1 mg tablet, TAKE 1 TABLET BY MOUTH EVERY 8 HOURS AS NEEDED FOR ANXIETY, Disp: 30 tablet, Rfl: 0  •  multivitamin (THERAGRAN) TABS, Take 1 tablet by mouth daily, Disp: , Rfl:   •  vitamin A 2400 MCG (8000 UT) capsule, Take 8,000 Units by mouth daily, Disp: , Rfl:   No current facility-administered medications for this visit      Facility-Administered Medications Ordered in Other Visits:   •  lactated ringers infusion, 75 mL/hr, Intravenous, Continuous, Sully Maxwell MD, Last Rate: 75 mL/hr at 11/10/22 0704, 75 mL/hr at 11/10/22 0704    Past Medical History:       Past Medical History:   Diagnosis Date   • Abnormal Pap smear of cervix    • Anxiety    • Binge eating disorder     on wellbutrin   • Breakthrough bleeding     D&C scheduled for 11/16/22   • COVID-19 07/15/2022   • Fibroid    • Genital warts    • Miscarriage Past Surgical History:   Procedure Laterality Date   • CERVICAL BIOPSY  W/ LOOP ELECTRODE EXCISION      Age 26   • CHOLECYSTECTOMY     • GASTRIC BYPASS     • TUBAL LIGATION          Family History   Problem Relation Age of Onset   • Heart disease Mother    • COPD Mother    • Other Mother    • Diabetes Mother         Social History     Socioeconomic History   • Marital status: Single     Spouse name: Not on file   • Number of children: Not on file   • Years of education: Not on file   • Highest education level: Not on file   Occupational History   • Not on file   Tobacco Use   • Smoking status: Never Smoker   • Smokeless tobacco: Never Used   Vaping Use   • Vaping Use: Never used   Substance and Sexual Activity   • Alcohol use: Yes     Alcohol/week: 1 0 standard drink     Types: 1 Standard drinks or equivalent per week     Comment: occ   • Drug use: Never   • Sexual activity: Yes     Partners: Male     Birth control/protection: Surgical     Comment: tubal ligation   Other Topics Concern   • Not on file   Social History Narrative   • Not on file     Social Determinants of Health     Financial Resource Strain: Not on file   Food Insecurity: Not on file   Transportation Needs: Not on file   Physical Activity: Not on file   Stress: Not on file   Social Connections: Not on file   Intimate Partner Violence: Not on file   Housing Stability: Not on file        Physical Exam:     /80   Pulse 85   Temp 97 5 °F (36 4 °C)   Resp 16   Ht 5' 3" (1 6 m)   Wt 60 8 kg (134 lb)   SpO2 97%   BMI 23 74 kg/m²     Physical Exam  Vitals reviewed  Constitutional:       Appearance: Normal appearance  She is well-developed  HENT:      Head: Normocephalic  Nose: Nose normal       Right Sinus: No maxillary sinus tenderness or frontal sinus tenderness  Left Sinus: No maxillary sinus tenderness or frontal sinus tenderness  Mouth/Throat:      Mouth: No oral lesions        Pharynx: No pharyngeal swelling, oropharyngeal exudate or posterior oropharyngeal erythema  Cardiovascular:      Rate and Rhythm: Normal rate and regular rhythm  Heart sounds: Normal heart sounds  Pulmonary:      Effort: Pulmonary effort is normal       Breath sounds: Normal breath sounds  Musculoskeletal:         General: Normal range of motion  Cervical back: Neck supple  Lymphadenopathy:      Cervical:      Right cervical: No superficial or posterior cervical adenopathy  Left cervical: No superficial or posterior cervical adenopathy  Skin:     General: Skin is warm and dry  Neurological:      Mental Status: She is alert and oriented to person, place, and time  Psychiatric:         Behavior: Behavior normal          Thought Content: Thought content normal          Judgment: Judgment normal           Data:     Pre-operative work-up         EKG: EKG showed sinus bradycardia and as per reading physician change in T wave amplitude in anterior leads  Will check potassium level and magnesium level and if normal then will clear for surgery at this time and advised to follow with cardiologist in future to r/o any other etiology related to that as currently patient is asymptomatic and denies any family h/o premature CAD       Recent Results (from the past 672 hour(s))   Type and screen    Collection Time: 11/08/22  2:39 PM   Result Value Ref Range    ABO Grouping O     Rh Factor Positive     Antibody Screen Negative     Specimen Expiration Date [id number]    ECG 12 lead    Collection Time: 11/08/22  2:49 PM   Result Value Ref Range    Ventricular Rate 54 BPM    Atrial Rate 54 BPM    NC Interval 142 ms    QRSD Interval 72 ms    QT Interval 464 ms    QTC Interval 440 ms    P Axis 45 degrees    QRS Axis 43 degrees    T Wave Axis 66 degrees           Assessment & Recommendations:     Problem List Items Addressed This Visit        Genitourinary    Abnormal uterine bleeding       Other    S/P partial gastrectomy    Anxiety      Other Visit Diagnoses     Preop examination    -  Primary    Sinus bradycardia        Relevant Orders    Ambulatory Referral to Cardiology    Potassium    Magnesium          Pre-Op Evaluation Assessment  47 y o  female with planned surgery: DILATATION AND CURETTAGE (D&C) WITH HYSTEROSCOPY, POSSIBLE POLYPECTOMY (N/A Uterus)  Known risk factors for perioperative complications: None  Current medications which may produce withdrawal symptoms if withheld perioperatively: none    Pre-Op Evaluation Plan      1  Medication Management/Recommendations:   - Patient has been instructed to avoid herbs or non-directed vitamins the week prior to surgery to ensure no drug interactions with perioperative surgical and anesthetic medications  - Patient has been instructed to avoid aspirin containing medications or non-steroidal anti-inflammatory drugs for the week preceding surgery  GABRIELA Risk:  GFR:        For PCP:  If GFR>60, Hold ACE/ARB/Diuretic on the day of surgery, and NSAIDS 10 days before  If GFR<45, Consider PRE and POST op Nephrology Consult  If 46 <GFR> 59 : Has Patient had GABRIELA in last 6 Months? no   If YES: Preop Nephrology consult   If No:  Adrian 26 Nephrology consult       Clearance  Clearance pending as blood work pending  Kristy Gomez[address] [address]  Phone#  [phone]  Fax#  [phone]

## 2022-11-10 NOTE — H&P
History and Physical - [facility]  Kimberley Pappas 47 y o  female MRN: [id number]                  HPI: Kimberley Pappas is a 47y o  year old female who presents for screening      REVIEW OF SYSTEMS: Per the HPI, and otherwise unremarkable      Historical Information   Past Medical History:   Diagnosis Date   • Abnormal Pap smear of cervix    • Anxiety    • Binge eating disorder     on wellbutrin   • Breakthrough bleeding     D&C scheduled for 11/16/22   • COVID-19 07/15/2022   • Fibroid    • Genital warts    • Miscarriage      Past Surgical History:   Procedure Laterality Date   • CERVICAL BIOPSY  W/ LOOP ELECTRODE EXCISION      Age 26   • CHOLECYSTECTOMY     • GASTRIC BYPASS     • TUBAL LIGATION       Social History   Social History     Substance and Sexual Activity   Alcohol Use Yes   • Alcohol/week: 1 0 standard drink   • Types: 1 Standard drinks or equivalent per week    Comment: occ     Social History     Substance and Sexual Activity   Drug Use Never     Social History     Tobacco Use   Smoking Status Never Smoker   Smokeless Tobacco Never Used     Family History   Problem Relation Age of Onset   • Heart disease Mother    • COPD Mother    • Other Mother    • Diabetes Mother        Meds/Allergies       Current Outpatient Medications:   •  B Complex Vitamins (VITAMIN B COMPLEX PO)  •  buPROPion (WELLBUTRIN XL) 300 mg 24 hr tablet  •  clobetasol (TEMOVATE) 0 05 % cream  •  Collagen-Boron-Hyaluronic Acid ([facility]) 40-5-3 3 MG TABS  •  LORazepam (ATIVAN) 1 mg tablet  •  multivitamin (THERAGRAN) TABS  •  vitamin A 2400 MCG (8000 UT) capsule    Current Facility-Administered Medications:   •  lactated ringers infusion, 75 mL/hr, Intravenous, Continuous, 75 mL/hr at 11/10/22 0704    No Known Allergies    Objective     /75   Pulse 55   Temp 97 6 °F (36 4 °C) (Temporal)   Resp 18   SpO2 100%       PHYSICAL EXAM    Gen: NAD  Head: NCAT  CV: RRR  CHEST: Clear  ABD: soft, NT/ND  EXT: no edema      ASSESSMENT/PLAN:  This is a 47y o  year old female here for colonoscopy  , and she is stable and optimized for her procedure

## 2022-11-10 NOTE — ANESTHESIA PREPROCEDURE EVALUATION
Procedure:  COLONOSCOPY    Relevant Problems   NEURO/PSYCH   (+) Anxiety   (+) History of cervical dysplasia      Other   (+) Binge eating disorder   (+) S/P partial gastrectomy   (+) S/P tubal ligation        Physical Exam    Airway    Mallampati score: II  TM Distance: >3 FB  Neck ROM: full     Dental       Cardiovascular  Rhythm: regular, Rate: normal,     Pulmonary  Breath sounds clear to auscultation,     Other Findings        Anesthesia Plan  ASA Score- 2     Anesthesia Type- IV sedation with anesthesia with ASA Monitors  Additional Monitors:   Airway Plan:           Plan Factors-    Chart reviewed  Patient is not a current smoker  Induction- intravenous  Postoperative Plan-     Informed Consent- Anesthetic plan and risks discussed with patient  I personally reviewed this patient with the CRNA  Discussed and agreed on the Anesthesia Plan with the CRNA  Reggie Dudley

## 2022-11-10 NOTE — ANESTHESIA POSTPROCEDURE EVALUATION
Post-Op Assessment Note    CV Status:  Stable  Pain Score: 0    Pain management: adequate     Mental Status:  Awake   Hydration Status:  Stable   PONV Controlled:  None   Airway Patency:  Patent      Post Op Vitals Reviewed: Yes      Staff: CRNA         No complications documented      BP   105/62   Temp      Pulse  52   Resp   15   SpO2   99

## 2022-11-11 ENCOUNTER — APPOINTMENT (OUTPATIENT)
Dept: LAB | Facility: HOSPITAL | Age: 52
End: 2022-11-11

## 2022-11-11 DIAGNOSIS — R00.1 SINUS BRADYCARDIA: ICD-10-CM

## 2022-11-11 LAB
MAGNESIUM SERPL-MCNC: 2 MG/DL (ref 1.6–2.6)
POTASSIUM SERPL-SCNC: 4.1 MMOL/L (ref 3.5–5.3)

## 2022-11-11 NOTE — PRE-PROCEDURE INSTRUCTIONS
My Surgical Experience    The following information was developed to assist you to prepare for your operation  What do I need to do before coming to the hospital?  • Arrange for a responsible person to drive you to and from the hospital   • Arrange care for your children at home  Children are not allowed in the recovery areas of the hospital  • Plan to wear clothing that is easy to put on and take off  If you are having shoulder surgery, wear a shirt that buttons or zippers in the front  Bathing  o Shower the evening before and the morning of your surgery with an antibacterial soap  Please refer to the Pre Op Showering Instructions for Surgery Patients Sheet   o Remove nail polish and all body piercing jewelry  o Do not shave any body part for at least 24 hours before surgery-this includes face, arms, legs and upper body  Food  o Nothing to eat or drink after midnight the night before your surgery  This includes candy and chewing gum  o Exception: If your surgery is after 12:00pm (noon), you may have clear liquids such as 7-Up®, ginger ale, apple or cranberry juice, Jell-O®, water, or clear broth until 8:00 am  o Do not drink milk or juice with pulp on the morning before surgery  o Do not drink alcohol 24 hours before surgery  Medicine  o Follow instructions you received from your surgeon about which medicines you may take on the day of surgery  o If instructed to take medicine on the morning of surgery, take pills with just a small sip of water  Call your prescribing doctor for specific infroamtion on what to do if you take insulin    What should I bring to the hospital?    Bring:  • Crutches or a walker, if you have them, for foot or knee surgery  • A list of the daily medicines, vitamins, minerals, herbals and nutritional supplements you take   Include the dosages of medicines and the time you take them each day  • Glasses, dentures or hearing aids  • Minimal clothing; you will be wearing hospital sleepwear  • Photo ID; required to verify your identity  • If you have a Living Will or Power of , bring a copy of the documents  • If you have an ostomy, bring an extra pouch and any supplies you use    Do not bring  • Medicines or inhalers  • Money, valuables or jewelry    What other information should I know about the day of surgery? • Notify your surgeons if you develop a cold, sore throat, cough, fever, rash or any other illness  • Report to the Ambulatory Surgical/Same Day Surgery Unit  • You will be instructed to stop at Registration only if you have not been pre-registered  • Inform your  fi they do not stay that they will be asked by the staff to leave a phone number where they can be reached  • Be available to be reached before surgery  In the event the operating room schedule changes, you may be asked to come in earlier or later than expected    *It is important to tell your doctor and others involved in your health care if you are taking or have been taking any non-prescription drugs, vitamins, minerals, herbals or other nutritional supplements  Any of these may interact with some food or medicines and cause a reaction      Pre-Surgery Instructions:   Medication Instructions   • B Complex Vitamins (VITAMIN B COMPLEX PO) Hold day of surgery  • buPROPion (WELLBUTRIN XL) 300 mg 24 hr tablet Take day of surgery  • clobetasol (TEMOVATE) 0 05 % cream Hold day of surgery  • Collagen-Boron-Hyaluronic Acid ([facility] Free [facility]) 40-5-3 3 MG TABS Hold day of surgery  • LORazepam (ATIVAN) 1 mg tablet Uses PRN- OK to take day of surgery   • multivitamin (THERAGRAN) TABS Hold day of surgery  • vitamin A 2400 MCG (8000 UT) capsule Hold day of surgery

## 2022-11-16 ENCOUNTER — ANESTHESIA EVENT (OUTPATIENT)
Dept: PERIOP | Facility: HOSPITAL | Age: 52
End: 2022-11-16

## 2022-11-16 ENCOUNTER — ANESTHESIA (OUTPATIENT)
Dept: PERIOP | Facility: HOSPITAL | Age: 52
End: 2022-11-16

## 2022-11-16 ENCOUNTER — HOSPITAL ENCOUNTER (OUTPATIENT)
Facility: HOSPITAL | Age: 52
Setting detail: OUTPATIENT SURGERY
Discharge: HOME | End: 2022-11-16
Attending: STUDENT IN AN ORGANIZED HEALTH CARE EDUCATION/TRAINING PROGRAM | Admitting: STUDENT IN AN ORGANIZED HEALTH CARE EDUCATION/TRAINING PROGRAM

## 2022-11-16 VITALS
BODY MASS INDEX: 23.88 KG/M2 | OXYGEN SATURATION: 100 % | TEMPERATURE: 98.8 F | WEIGHT: 134.8 LBS | SYSTOLIC BLOOD PRESSURE: 144 MMHG | RESPIRATION RATE: 20 BRPM | HEART RATE: 56 BPM | DIASTOLIC BLOOD PRESSURE: 78 MMHG | HEIGHT: 63 IN

## 2022-11-16 DIAGNOSIS — N93.9 ABNORMAL UTERINE BLEEDING: ICD-10-CM

## 2022-11-16 DIAGNOSIS — Z98.890 STATUS POST HYSTEROSCOPY: Primary | ICD-10-CM

## 2022-11-16 PROBLEM — N95.0 POSTMENOPAUSAL BLEEDING: Status: ACTIVE | Noted: 2022-11-16

## 2022-11-16 LAB
ABO GROUP BLD: NORMAL
BLD GP AB SCN SERPL QL: NEGATIVE
RH BLD: POSITIVE
SPECIMEN EXPIRATION DATE: NORMAL

## 2022-11-16 RX ORDER — MIDAZOLAM HYDROCHLORIDE 2 MG/2ML
INJECTION, SOLUTION INTRAMUSCULAR; INTRAVENOUS AS NEEDED
Status: DISCONTINUED | OUTPATIENT
Start: 2022-11-16 | End: 2022-11-16

## 2022-11-16 RX ORDER — FENTANYL CITRATE/PF 50 MCG/ML
25 SYRINGE (ML) INJECTION
Status: DISCONTINUED | OUTPATIENT
Start: 2022-11-16 | End: 2022-11-16 | Stop reason: HOSPADM

## 2022-11-16 RX ORDER — SODIUM CHLORIDE, SODIUM LACTATE, POTASSIUM CHLORIDE, CALCIUM CHLORIDE 600; 310; 30; 20 MG/100ML; MG/100ML; MG/100ML; MG/100ML
125 INJECTION, SOLUTION INTRAVENOUS CONTINUOUS
Status: DISCONTINUED | OUTPATIENT
Start: 2022-11-16 | End: 2022-11-16 | Stop reason: HOSPADM

## 2022-11-16 RX ORDER — MAGNESIUM HYDROXIDE 1200 MG/15ML
LIQUID ORAL AS NEEDED
Status: DISCONTINUED | OUTPATIENT
Start: 2022-11-16 | End: 2022-11-16 | Stop reason: HOSPADM

## 2022-11-16 RX ORDER — LIDOCAINE HYDROCHLORIDE 10 MG/ML
INJECTION, SOLUTION EPIDURAL; INFILTRATION; INTRACAUDAL; PERINEURAL AS NEEDED
Status: DISCONTINUED | OUTPATIENT
Start: 2022-11-16 | End: 2022-11-16

## 2022-11-16 RX ORDER — DEXAMETHASONE SODIUM PHOSPHATE 4 MG/ML
INJECTION, SOLUTION INTRA-ARTICULAR; INTRALESIONAL; INTRAMUSCULAR; INTRAVENOUS; SOFT TISSUE AS NEEDED
Status: DISCONTINUED | OUTPATIENT
Start: 2022-11-16 | End: 2022-11-16

## 2022-11-16 RX ORDER — ACETAMINOPHEN 325 MG/1
650 TABLET ORAL EVERY 6 HOURS PRN
Refills: 0
Start: 2022-11-16

## 2022-11-16 RX ORDER — ONDANSETRON 2 MG/ML
INJECTION INTRAMUSCULAR; INTRAVENOUS AS NEEDED
Status: DISCONTINUED | OUTPATIENT
Start: 2022-11-16 | End: 2022-11-16

## 2022-11-16 RX ORDER — PROPOFOL 10 MG/ML
INJECTION, EMULSION INTRAVENOUS AS NEEDED
Status: DISCONTINUED | OUTPATIENT
Start: 2022-11-16 | End: 2022-11-16

## 2022-11-16 RX ORDER — FENTANYL CITRATE 50 UG/ML
INJECTION, SOLUTION INTRAMUSCULAR; INTRAVENOUS AS NEEDED
Status: DISCONTINUED | OUTPATIENT
Start: 2022-11-16 | End: 2022-11-16

## 2022-11-16 RX ADMIN — MIDAZOLAM 2 MG: 1 INJECTION INTRAMUSCULAR; INTRAVENOUS at 09:55

## 2022-11-16 RX ADMIN — FENTANYL CITRATE 25 MCG: 50 INJECTION, SOLUTION INTRAMUSCULAR; INTRAVENOUS at 10:11

## 2022-11-16 RX ADMIN — PROPOFOL 150 MG: 10 INJECTION, EMULSION INTRAVENOUS at 10:01

## 2022-11-16 RX ADMIN — LIDOCAINE HYDROCHLORIDE 50 MG: 10 INJECTION, SOLUTION EPIDURAL; INFILTRATION; INTRACAUDAL; PERINEURAL at 10:01

## 2022-11-16 RX ADMIN — FENTANYL CITRATE 25 MCG: 50 INJECTION, SOLUTION INTRAMUSCULAR; INTRAVENOUS at 10:01

## 2022-11-16 RX ADMIN — SODIUM CHLORIDE, SODIUM LACTATE, POTASSIUM CHLORIDE, AND CALCIUM CHLORIDE: .6; .31; .03; .02 INJECTION, SOLUTION INTRAVENOUS at 09:42

## 2022-11-16 RX ADMIN — DEXAMETHASONE SODIUM PHOSPHATE 4 MG: 4 INJECTION, SOLUTION INTRA-ARTICULAR; INTRALESIONAL; INTRAMUSCULAR; INTRAVENOUS; SOFT TISSUE at 10:09

## 2022-11-16 RX ADMIN — FENTANYL CITRATE 50 MCG: 50 INJECTION, SOLUTION INTRAMUSCULAR; INTRAVENOUS at 10:25

## 2022-11-16 RX ADMIN — ONDANSETRON 4 MG: 2 INJECTION INTRAMUSCULAR; INTRAVENOUS at 10:09

## 2022-11-16 NOTE — DISCHARGE INSTRUCTIONS
Hysteroscopy   AMBULATORY CARE:   What you need to know about a hysteroscopy:  A hysteroscopy is a procedure to find and treat problems in your uterus  A hysteroscopy may be done to find, and possibly treat, the cause of abnormal vaginal bleeding, problems getting pregnant, or miscarriage  It may also be done to insert or remove a device that prevents pregnancy  How to prepare for a hysteroscopy: Your procedure may be done at your healthcare provider's office, an outpatient facility, or a hospital  Arrange for someone to drive you home after your procedure  Your healthcare provider will talk to you about how to prepare  You may be told not to eat or drink anything after midnight on the day of your procedure  You may need to stop taking blood thinners or aspirin several days before your procedure  This will help decrease your risk for bleeding  Your provider will tell you what medicines to take or not take on the day of your procedure  You may be told to take ibuprofen on the day of your procedure to control pain  You may be given an antibiotic to prevent infection  Tell healthcare providers if you have ever had an allergic reaction to an antibiotic  Your healthcare provider may put medicine on your cervix before your procedure  This will help open your cervix so the scope can be inserted into your uterus more easily  A scope is a small tube with a light and a camera on the end  What will happen during a hysteroscopy: You may be given general anesthesia to keep you asleep and free from pain  You may instead be given medicine to help you relax, and local or spinal anesthesia  With local or spinal anesthesia, you may still feel pressure or pushing, but you should not feel any pain  Your healthcare provider will gently insert a device into your vagina  The device opens the walls of your vagina so your healthcare provider can see your cervix  Tools or medicine will be used to open your cervix  Your provider will insert the scope through your cervix and into your uterus  Your provider may inject air or fluid to help see inside your uterus more clearly  Tools inserted through the scope may be used to remove scar tissue, growths such as polyps or fibroids, or a sample of tissue  Tools may also be used to control bleeding  A vaginal pack or sanitary pad may be used to absorb the bleeding  A vaginal pack is a special gauze that is inserted into the vagina  It will be removed before you go home  What will happen after a hysteroscopy:  Healthcare providers will monitor you until you are awake  You may be able to go home, or you may need to spend a night in the hospital  It is normal to have vaginal bleeding and cramps for 2 to 3 days after your procedure  Your bleeding may range from barely staining a pad to soaking a pad every 2 to 3 hours  You may see blood clots on the pad  Call your healthcare provider if you see blood clots that are larger than the size of a quarter  Risks of a hysteroscopy: You may get an infection or bleed more than expected  Scar tissue may form in your uterus  This may make it difficult to get pregnant  You may have an allergic reaction to the fluid injected into your uterus  The fluid may build up and cause problems in your lungs, heart, or brain  This may become life-threatening  The scope or tools may make a hole in your cervix, uterus, bowels, or bladder  This may require more surgery to fix and may become life-threatening  Call 911 if:   You have trouble breathing  Seek care immediately if:   You have heavy vaginal bleeding that fills 1 or more sanitary pads in 1 hour  You have severe pain or bloating in your abdomen  You feel lightheaded, weak, and confused  You see blood in your urine  You stop urinating or urinate less than usual     Contact your healthcare provider if:   You have a fever or chills      You have pus or a foul-smelling odor coming from your vagina  You see blood clots on your sanitary pad that are larger than the size of a quarter  You have nausea or are vomiting  Your skin is itchy, swollen, or you have a rash  You have questions or concerns about your condition or care  Medicines: You may need any of the following:  Estrogen  helps heal the lining of your uterus  Antibiotics  help prevent a bacterial infection  Prescription pain medicine  may be given  Ask your healthcare provider how to take this medicine safely  Some prescription pain medicines contain acetaminophen  Do not take other medicines that contain acetaminophen without talking to your healthcare provider  Too much acetaminophen may cause liver damage  Prescription pain medicine may cause constipation  Ask your healthcare provider how to prevent or treat constipation  NSAIDs , such as ibuprofen, help decrease swelling, pain, and fever  NSAIDs can cause stomach bleeding or kidney problems in certain people  If you take blood thinner medicine, always ask your healthcare provider if NSAIDs are safe for you  Always read the medicine label and follow directions  Take your medicine as directed  Contact your healthcare provider if you think your medicine is not helping or if you have side effects  Tell him or her if you are allergic to any medicine  Keep a list of the medicines, vitamins, and herbs you take  Include the amounts, and when and why you take them  Bring the list or the pill bottles to follow-up visits  Carry your medicine list with you in case of an emergency  Activity:  Do not have sex, use tampons, or douche for up to 6 weeks  These actions may cause an infection  Ask your healthcare provider if it is okay to take a tub bath or swim  Rest as needed  Do not drive, return to work, or exercise for 24 hours or as directed  You can return to most activities in 1 to 2 days     Follow up with your doctor as directed:  Write down your questions so you remember to ask them during your visits  © Copyright [employer] 2022 Information is for End User's use only and may not be sold, redistributed or otherwise used for commercial purposes  All illustrations and images included in CareNotes® are the copyrighted property of A [employer] M [employer] Inc  or Janeth Foy  The above information is an  only  It is not intended as medical advice for individual conditions or treatments  Talk to your doctor, nurse or pharmacist before following any medical regimen to see if it is safe and effective for you

## 2022-11-16 NOTE — ANESTHESIA PREPROCEDURE EVALUATION
Procedure:  HYSTEROSCOPY, DILATATION AND ENDOMETRIAL BIOPSY (Uterus)    Relevant Problems   NEURO/PSYCH   (+) Anxiety   (+) History of cervical dysplasia        Physical Exam    Airway    Mallampati score: II  TM Distance: >3 FB  Neck ROM: full     Dental   No notable dental hx     Cardiovascular  Cardiovascular exam normal    Pulmonary  Pulmonary exam normal     Other Findings        Anesthesia Plan  ASA Score- 2     Anesthesia Type- general with ASA Monitors  Additional Monitors:   Airway Plan: LMA  Plan Factors-Exercise tolerance (METS): >4 METS  Chart reviewed  Imaging results reviewed  Existing labs reviewed  Patient summary reviewed  Patient is not a current smoker  Induction- intravenous  Postoperative Plan- Plan for postoperative opioid use  Informed Consent- Anesthetic plan and risks discussed with patient  I personally reviewed this patient with the CRNA  Discussed and agreed on the Anesthesia Plan with the CRNA  Liza Pryor

## 2022-11-16 NOTE — INTERVAL H&P NOTE
H&P reviewed  After examining the patient I find no changes in the patients condition since the H&P had been written  Vitals:    11/16/22 0850   BP: 117/79   Pulse: (!) 52   Resp: 18   Temp: 98 7 °F (37 1 °C)   SpO2: 98%     Surgery and consent reviewed  No changes  Has cards follow-up for bradycardia noted on EKG, otherwise cleared for procedure today  All questions answered to the best of my ability

## 2022-11-16 NOTE — ANESTHESIA POSTPROCEDURE EVALUATION
Post-Op Assessment Note    CV Status:  Stable  Pain Score: 0    Pain management: adequate     Mental Status:  Alert and awake   Hydration Status:  Stable   PONV Controlled:  Controlled   Airway Patency:  Patent and adequate      Post Op Vitals Reviewed: Yes      Staff: CRNA         No notable events documented      BP      Temp      Pulse    Resp      SpO2

## 2022-11-16 NOTE — PERIOPERATIVE NURSING NOTE
Patient received into [facility] room 9 via stretcher from [facility]  Patient assisted oob to bathroom and voided  No vaginal bleeding noted  Patient denies cramping or pain, tolerating fluids well 
no anemia, no easy bruising, no jaundice, no swollen lymph nodes.

## 2022-11-16 NOTE — OP NOTE
OPERATIVE REPORT  PATIENT NAME: Maury Jo    :  1970  MRN: [id number]  Pt Location: [facility] OR ROOM 04    SURGERY DATE: 2022    Surgeon(s) and Role:     * Coral Vazquez MD - Primary    Preop Diagnosis:  Abnormal uterine bleeding [N93 9]    Post-Op Diagnosis Codes:     * Abnormal uterine bleeding [N93 9]    Procedure(s) (LRB):  HYSTEROSCOPY, DILATATION AND ENDOMETRIAL BIOPSY (N/A)    Specimen(s):  ID Type Source Tests Collected by Time Destination   1 : ENDOMETRIAL BIOPSY  Tissue Endometrium TISSUE EXAM Coral Vazquez MD 2022 1034        Estimated Blood Loss:   Minimal    Drains:  * No LDAs found *    Anesthesia Type:   General with LMA    Operative Indications:  Abnormal uterine bleeding [N93 9]    Operative Findings:  Exam under anesthesia notable for normal external genitalia, chela vagina, multiparous cervix, small, mobile uterus  No external cervical stenosis, but stenosis appreciated at approximately 3-4 cm past external cervical os, likely in keeping with uterine cavity scarring from endometrial ablation  Endometrial sampling with endometrial pipelle was performed once cavity sounded to 6-cm  When hysteroscopy unable to be passed, further dilation performed with blunt dilators  When hysteroscopy replaced, low pressure noted by machine and the hysteroscope was immediately withdrawn  The uterine sound replaced and perforation confirmed as sound passed to 14-cm  Procedure was stopped at this time  Fluid deficit noted to be 50 mL  Given lack of bleeding, lack of concern for injury to surrounding structured (perforation occurred with blunt instrument) and patient stability, the procedure aborted at this time and patient taken to PACU with plan for closer, longer monitoring      Complications:   Uterine perforation with blunt instrument    Procedure and Technique:    Description of Procedure  Patient was taken to the operating room were a time out was performed to confirm correct patient and correct procedure  General LMA anesthesia (LMA) was administered and the patient was positioned on the OR table in the dorsal lithotomy position  All pressure points were padded and a mark hugger was placed to maintain control of core body temperature  A bimanual exam was performed and the uterus was noted to be anteverted, normal in size and consistency with no palpable adnexal masses or fullness  The patient was prepped and draped in the usual sterile fashion  Operative Technique  A non-weighted speculum was inserted into the vagina and a Carine retractor was used to visualize the anterior lip of the cervix, which was then grasped with a single-tooth tenaculum  Stenosis or scarring noted approximately 3-4 cm past external cervical os, likely in keeping with uterine cavity scarring from endometrial ablation  The cervix was serially dilated to 19 Fr using Vega dilators for introduction of the hysteroscope  Uterus sounded to 6-cm at this point  Given known high risk for perforation due to history of endometrial ablation, cervical stenosis, and perimenopausal status, sampling was performed with endometrial pipelle prior to further dilation  The Symphion hysteroscope was introduced under direct visualization using normal saline solution as the distention media  Hysteroscope was unable to pass the internal cervical os to reach the uterine fundus  The hysteroscopy was removed and the cervix again dilated to to 21 Fr using Vega dilators  The hysteroscope was replaced, low pressure noted by the Symphion machine, and the hysteroscope was immediately withdrawn  The uterine sound was then carefully replaced and uterine perforation confirmed as sound passed to 14-cm  Procedure was stopped at this time  Fluid deficit noted to be 50 mL   Given lack of bleeding, lack of concern for injury to surrounding structured (perforation occurred with blunt instrument) and patient stability, the procedure aborted at this time  The single-tooth tenaculum was removed from the anterior lip of the cervix and excellent hemostasis appreciated  All instruments were removed from the vagina  At the conclusion of the procedure, all needle, sponge, and instrument counts were noted to be correct x2  Patient tolerated the procedure well and was transferred to PACU in stable condition prior to discharge with follow up in 1-2 weeks       I was present for the entire procedure    Patient Disposition:  PACU         SIGNATURE: Marleny Shea MD  DATE: November 16, 2022  TIME: 11:04 AM

## 2022-11-18 ENCOUNTER — TELEPHONE (OUTPATIENT)
Dept: OBGYN CLINIC | Facility: CLINIC | Age: 52
End: 2022-11-18

## 2022-11-19 NOTE — TELEPHONE ENCOUNTER
S/p dilation, hysteroscopy 11/16/22 aborted after perforation   Performed for postmenopausal bleeding and thickened endometrial lining    Called to check-in  No pain, no bleeding, no fevers or chills  Eating well, no nausea or vomiting  Bathroom ok, no issues    Pathology pending    RTO for post-op check    Reviewed precautions for calling or presenting for eval--severe pain unrelieved by tylenol/motrin, heavy bleeding, fevers, etc   All questions answered to the best of my ability      Tiffany Neal MD   11/18/22   8:39 PM

## 2022-11-28 ENCOUNTER — OFFICE VISIT (OUTPATIENT)
Dept: OBGYN CLINIC | Facility: CLINIC | Age: 52
End: 2022-11-28

## 2022-11-28 VITALS — SYSTOLIC BLOOD PRESSURE: 110 MMHG | DIASTOLIC BLOOD PRESSURE: 70 MMHG | WEIGHT: 138 LBS | BODY MASS INDEX: 24.45 KG/M2

## 2022-11-28 DIAGNOSIS — Z48.89 ENCOUNTER FOR POSTOPERATIVE CARE: Primary | ICD-10-CM

## 2022-11-28 DIAGNOSIS — N95.1 HOT FLASH, MENOPAUSAL: ICD-10-CM

## 2022-11-29 ENCOUNTER — APPOINTMENT (OUTPATIENT)
Dept: LAB | Facility: HOSPITAL | Age: 52
End: 2022-11-29

## 2022-11-29 DIAGNOSIS — N95.1 HOT FLASH, MENOPAUSAL: ICD-10-CM

## 2022-11-29 LAB — FSH SERPL-ACNC: 89.5 MIU/ML

## 2022-12-08 DIAGNOSIS — F41.9 ANXIETY: ICD-10-CM

## 2022-12-08 DIAGNOSIS — N90.4 LICHEN SCLEROSUS OF VULVA: ICD-10-CM

## 2022-12-08 RX ORDER — LORAZEPAM 1 MG/1
1 TABLET ORAL DAILY PRN
Qty: 15 TABLET | Refills: 0 | Status: SHIPPED | OUTPATIENT
Start: 2022-12-08

## 2022-12-08 RX ORDER — CLOBETASOL PROPIONATE 0.5 MG/G
CREAM TOPICAL
Qty: 60 G | Refills: 3 | Status: SHIPPED | OUTPATIENT
Start: 2022-12-08 | End: 2023-12-07

## 2023-01-19 ENCOUNTER — HOSPITAL ENCOUNTER (OUTPATIENT)
Dept: RADIOLOGY | Facility: HOSPITAL | Age: 53
Discharge: HOME | End: 2023-01-19

## 2023-01-19 VITALS — HEIGHT: 63 IN | WEIGHT: 129 LBS | BODY MASS INDEX: 22.86 KG/M2

## 2023-01-19 DIAGNOSIS — Z12.31 BREAST CANCER SCREENING BY MAMMOGRAM: ICD-10-CM

## 2023-02-15 ENCOUNTER — HOSPITAL ENCOUNTER (OUTPATIENT)
Dept: RADIOLOGY | Facility: HOSPITAL | Age: 53
Discharge: HOME | End: 2023-02-15

## 2023-02-15 DIAGNOSIS — R92.8 ABNORMAL SCREENING MAMMOGRAM: ICD-10-CM

## 2023-03-20 DIAGNOSIS — F41.9 ANXIETY: ICD-10-CM

## 2023-03-20 RX ORDER — LORAZEPAM 1 MG/1
1 TABLET ORAL DAILY PRN
Qty: 15 TABLET | Refills: 0 | Status: SHIPPED | OUTPATIENT
Start: 2023-03-20

## 2023-04-03 ENCOUNTER — OFFICE VISIT (OUTPATIENT)
Dept: OBGYN CLINIC | Facility: CLINIC | Age: 53
End: 2023-04-03

## 2023-04-03 VITALS — WEIGHT: 132 LBS | BODY MASS INDEX: 23.38 KG/M2 | DIASTOLIC BLOOD PRESSURE: 72 MMHG | SYSTOLIC BLOOD PRESSURE: 112 MMHG

## 2023-04-03 DIAGNOSIS — Z01.818 PREOPERATIVE EXAM FOR GYNECOLOGIC SURGERY: Primary | ICD-10-CM

## 2023-04-03 DIAGNOSIS — R93.89 THICKENED ENDOMETRIUM: ICD-10-CM

## 2023-04-03 DIAGNOSIS — Z87.42 HISTORY OF POSTMENOPAUSAL BLEEDING: ICD-10-CM

## 2023-04-03 NOTE — PROGRESS NOTES
Assessment/Plan:  Ashley Adame is a 47 y o  [id number] with history of postmenopausal bleeding, thickened endometrial stripe, failed hysteroscopy who presents for follow-up     1  Preoperative exam for gynecologic surgery        2  Thickened endometrium  MRI pelvis female wo and w contrast      3  History of postmenopausal bleeding  MRI pelvis female wo and w contrast         Reviewed inability to resample, recommend excision with hysterectomy, prefers to have total laparoscopic hysterectomy and BSO (given postmenopausal status--discussed could precipitate recurrence of hot flashes, etc) in December  Given no further bleeding, will pursue pelvic MRI to follow-up limited evaluation on pelvic US from 2022  If concerning for underlying malignancy, then recommend moving up hysterectomy  If reassuring, reasonable to delay hysterectomy to December as potentially polyp  If normal, then perhaps had old clotted blood that passed after hysteroscopy and can postpone surgery if desires, understanding there is always the possibility of something underlying  cannot be reassured unless tissue diagnosis, which would necessitate hysterectomy  Reviewed procedure in detail and risks including but not limited to VTE/stroke, bleeding that may require transfusion, infection, injury to surrounding structures including bowel or bladder that may require additional procedures or surgeries  Reviewed possibility of open incision if any unanticipated difficulty or complications  Surgical consent reviewed and signed today  Reviewed need to fast after midnight (or at least 8 hours) prior to surgery  Subjective:      Patient ID: Ashley Adame is a 47 y o  female      HPI  Hx of endometrial ablation  Single episode of postmenopasual bleeding  Endobx failed in office  Hysteroscopy failed in OR (perforation) but endometrial biopsy performed before perforation benign  Reviewed pelvic US with thickening that seems suggestive of endometrial polyp given vascularity, though possible endometrial tissue/blood products with hematometra from ablation? Reports no bleeding since  No pelvic pain    Recovering well from abdominoplasty    The following portions of the patient's history were reviewed and updated as appropriate:   She  has a past medical history of Abnormal Pap smear of cervix, Anxiety, Binge eating disorder, Breakthrough bleeding, COVID-19 (07/15/2022), Fibroid, Genital warts, and Miscarriage  She  has a past surgical history that includes Gastric bypass; Cholecystectomy; Cervical biopsy w/ loop electrode excision; Tubal ligation; and pr hysteroscopy bx endometrium&/polypc w/wo d&c (N/A, 11/16/2022)  Current Outpatient Medications on File Prior to Visit   Medication Sig   • B Complex Vitamins (VITAMIN B COMPLEX PO) Take by mouth every other day     • buPROPion (WELLBUTRIN XL) 300 mg 24 hr tablet Take 1 tablet (300 mg total) by mouth daily   • clobetasol (TEMOVATE) 0 05 % cream Apply 5 g (1 application total) topically 2 (two) times a day for 14 days, THEN 5 g (1 application total) 3 (three) times a week  • Collagen-Boron-Hyaluronic Acid (Move Free FreshT) 40-5-3 3 MG TABS Take by mouth   • LORazepam (ATIVAN) 1 mg tablet Take 1 tablet (1 mg total) by mouth daily as needed for anxiety   • multivitamin (THERAGRAN) TABS Take 1 tablet by mouth daily   • vitamin A 2400 MCG (8000 UT) capsule Take 8,000 Units by mouth daily     No current facility-administered medications on file prior to visit  She has No Known Allergies       Review of Systems   Constitutional: Negative for unexpected weight change  HENT: Negative for trouble swallowing  Respiratory: Negative for shortness of breath  Cardiovascular: Negative for chest pain  Gastrointestinal: Negative for abdominal pain  Genitourinary: Negative for pelvic pain, vaginal bleeding and vaginal discharge  Musculoskeletal: Negative for gait problem           Objective:  /72 (BP Location: Right arm, Patient Position: Sitting, Cuff Size: Standard)   Wt 59 9 kg (132 lb)   BMI 23 38 kg/m²      Physical Exam  Constitutional:       General: She is not in acute distress  Appearance: Normal appearance  She is not ill-appearing or toxic-appearing  HENT:      Head: Normocephalic and atraumatic  Eyes:      Extraocular Movements: Extraocular movements intact  Conjunctiva/sclera: Conjunctivae normal    Cardiovascular:      Rate and Rhythm: Normal rate and regular rhythm  Heart sounds: Normal heart sounds  Pulmonary:      Effort: Pulmonary effort is normal       Breath sounds: Normal breath sounds  Abdominal:      General: There is no distension  Palpations: Abdomen is soft  Tenderness: There is no abdominal tenderness  There is no guarding  Comments: Well-healed abdominoplasty scar   Musculoskeletal:         General: Normal range of motion  Cervical back: Normal range of motion  Skin:     General: Skin is warm and dry  Neurological:      General: No focal deficit present  Psychiatric:         Mood and Affect: Mood normal          Behavior: Behavior normal          Thought Content:  Thought content normal

## 2023-04-06 ENCOUNTER — TELEPHONE (OUTPATIENT)
Dept: GYNECOLOGY | Facility: CLINIC | Age: 53
End: 2023-04-06

## 2023-04-06 ENCOUNTER — TELEPHONE (OUTPATIENT)
Dept: FAMILY MEDICINE CLINIC | Facility: CLINIC | Age: 53
End: 2023-04-06

## 2023-04-06 DIAGNOSIS — Z98.890 S/P ABDOMINOPLASTY: Primary | ICD-10-CM

## 2023-04-06 NOTE — TELEPHONE ENCOUNTER
I received a call from this pt who was just seen on 4/3/23 with Dr Kasandra De La Cruz and I haven't received any consents on the pt to schedule her surgery  Can someone please email them to me?

## 2023-04-06 NOTE — TELEPHONE ENCOUNTER
Patient left a msg on clinical hotline  She is requesting an order for PT post abdominoplasty for tightness  Her surgery was within [facility] but would like to do PT within [place]

## 2023-04-24 ENCOUNTER — OFFICE VISIT (OUTPATIENT)
Dept: PHYSICAL THERAPY | Facility: CLINIC | Age: 53
End: 2023-04-24

## 2023-04-24 DIAGNOSIS — Z98.890 S/P ABDOMINOPLASTY: Primary | ICD-10-CM

## 2023-04-24 DIAGNOSIS — M54.2 NECK PAIN: ICD-10-CM

## 2023-04-24 NOTE — PROGRESS NOTES
"Daily Note     Today's date: 2023  Patient name: Karin Pickett  : 1970  MRN: [id number]  Referring provider: Woody Oakes MD  Dx:   Encounter Diagnosis     ICD-10-CM    1  S/P abdominoplasty  Z98 890       2  Neck pain  M54 2                      Subjective: Reports feeling pretty good      Objective: See treatment diary below      Assessment: Tolerated treatment well  Patient would benefit from continued PT in order to increase tissue extensibility of abdominals, and scar tissue  Did well with addition of step for aerobic warm up  Continue diaphragmatic expansion work  Plan: Continue per plan of care             Insurance:  AMA/CMS Eval/ Re-eval POC expires Mónica Carbo #/ Referral # Total   Visits  Start date  Expiration date Extension  Visit limitation? PT only or  PT+OT?  Co-Insurance   BCBS   Auth submitted     BOMN PT No                                                                 AUTH #: Submitted Date            Visits  Authed:  Used 1 1 1 1            Remaining  11 10 9 8                   Precautions: standard  Medical History        Past Medical History:   Diagnosis Date   • Abnormal Pap smear of cervix     • Anxiety     • Binge eating disorder       on wellbutrin   • COVID-19 07/15/2022             Date 23      Visit Number 2 3 4                Manual         P-A mobs         Anterior innominate mobilization   Abdominal stretching; soft cupping      STM Scar massage to incisions  Scar massage to incisions                Neuro Re-ed         TrA progression 10x 10x       TA+ march 15x b/l 15x b/l       TA + SLR 20x b/l 15x b/l          TRX staggered legs abdominal stretch 3*20 sec      Hip add squeeze         SLR \"rainbow\"         Clamshells          Bridge  20x + ADD squeeze 20x + ADD squeeze 20x + ADD squeeze      Sciatic nerve sliders         SEVERO         Hip ABD/ ADD         Left hip flexion/ knee to chest         Prone press up    " "     Thera Ex         CHARYL/REIL Standing extension over plinth 10x 10 sec hold CHARLY at plinth 2x10  2*10      TB shoulder ext   2*10 12 5#      TB shoulder row   2*10 12 5#      Paloff press          Anti-rotation press   Step ups with various foot work 5 min       band \"pull the sword\"         Lateral walk          Sit to stand         Cat cow 20x 20x 20x       cat to glutes on heels         Reciprocal gait with canes   4x50'        Leg circles         Arm banded, leg extension         Roll up with theraband          Rec bike 6' NS x8' L1        Pointer          wisdom pose with walkout         Thera Activity         Patient education         Lifting mechanics         Posture education                                                           Modalities                                                        "

## 2023-04-26 ENCOUNTER — OFFICE VISIT (OUTPATIENT)
Dept: PHYSICAL THERAPY | Facility: CLINIC | Age: 53
End: 2023-04-26

## 2023-04-26 DIAGNOSIS — M54.2 NECK PAIN: ICD-10-CM

## 2023-04-26 DIAGNOSIS — Z98.890 S/P ABDOMINOPLASTY: Primary | ICD-10-CM

## 2023-04-26 NOTE — PROGRESS NOTES
"Daily Note     Today's date: 2023  Patient name: Lisa Hernandez  : 1970  MRN: [id number]  Referring provider: Manuel Rajput MD  Dx:   Encounter Diagnosis     ICD-10-CM    1  S/P abdominoplasty  Z98 890       2  Neck pain  M54 2           Start Time: 1500  Stop Time: 1545  Total time in clinic (min): 45 minutes    Subjective: Patient reports less spasming of her abdominal region today  Objective: See treatment diary below      Assessment: Tolerated treatment well  Provided patient with SOC to help facilitate mobility into extension  Patient is progressing well at this time  Will continue to add strengthening as able  Patient would benefit from continued PT        Plan: Continue per plan of care             Insurance:  AMA/CMS Eval/ Re-eval POC expires Edwin Chacon #/ Referral # Total   Visits  Start date  Expiration date Extension  Visit limitation? PT only or  PT+OT?  Co-Insurance   BCBS   Auth submitted     BOMN PT No                                                                 AUTH #: Submitted Date           Visits  Authed:  Used 1 1 1 1 1           Remaining  11 10 9 8 7                  Precautions: standard  Medical History        Past Medical History:   Diagnosis Date   • Abnormal Pap smear of cervix     • Anxiety     • Binge eating disorder       on wellbutrin   • COVID-19 07/15/2022             Date 23     Visit Number 2 3 4 5               Manual         P-A mobs         Anterior innominate mobilization   Abdominal stretching; soft cupping      STM Scar massage to incisions  Scar massage to incisions                Neuro Re-ed         TrA progression 10x 10x  3sx10     TA+ march 15x b/l 15x b/l       TA + SLR 20x b/l 15x b/l          TRX staggered legs abdominal stretch 3*20 sec      Hip add squeeze         SLR \"rainbow\"         SOC    x10      Bridge  20x + ADD squeeze 20x + ADD squeeze 20x + ADD squeeze 2x10 with add " "squeeze     Sciatic nerve sliders         SEVERO         Hip ABD/ ADD         Left hip flexion/ knee to chest         Prone press up         Thera Ex         CHARLY/REIL Standing extension over plinth 10x 10 sec hold CHARLY at plinth 2x10  2*10 x10     TB shoulder ext   2*10 12 5# 2x10 9#      TB shoulder row   2*10 12 5# 2x10 9#     Paloff press          Anti-rotation press   Step ups with various foot work 5 min       band \"pull the sword\"         Lateral walk          Sit to stand         Cat cow 20x 20x 20x 20x      cat to glutes on heels         Reciprocal gait with canes   4x50'        Leg circles         Arm banded, leg extension         LTR    2x10       Rec bike 6' NS x8' L1   NS x8' L1      Pointer          wisdom pose with walkout         Thera Activity         Patient education         Lifting mechanics         Posture education                                                           Modalities                                                        "

## 2023-05-01 ENCOUNTER — OFFICE VISIT (OUTPATIENT)
Dept: PHYSICAL THERAPY | Facility: CLINIC | Age: 53
End: 2023-05-01

## 2023-05-01 DIAGNOSIS — Z98.890 S/P ABDOMINOPLASTY: Primary | ICD-10-CM

## 2023-05-01 DIAGNOSIS — M54.2 NECK PAIN: ICD-10-CM

## 2023-05-01 NOTE — PROGRESS NOTES
Daily Note     Today's date: 2023  Patient name: Sharon Wilson  : 1970  MRN: [id number]  Referring provider: Speedy Bradshaw MD  Dx:   Encounter Diagnosis     ICD-10-CM    1  S/P abdominoplasty  Z98 890       2  Neck pain  M54 2           Start Time: 1500  Stop Time: 1545  Total time in clinic (min): 45 minutes    Subjective: Patient reports some concern about needing a hysterectomy  Feels like she has some moderate tightness in abdomen  She has a hx of random spasms of abdomen  Objective: See treatment diary below      Assessment: Tolerated treatment well  Patient would benefit from continued PT in order to increase tissue extensibility of abdominals  Did very well with abdominal stacking and pin and stretch  Said she felt like she was almost normal afterwards  Plan: Continue per plan of care             Insurance:  AMA/CMS Eval/ Re-eval POC expires Joanie García #/ Referral # Total   Visits  Start date  Expiration date Extension  Visit limitation? PT only or  PT+OT?  Co-Insurance   BCBS   Auth submitted     BOMN PT No                                                                 AUTH #: Submitted Date          Visits  Authed:  Used 1 1 1 1 1 1          Remaining  11 10 9 8 7 6                 Precautions: standard  Medical History        Past Medical History:   Diagnosis Date    Abnormal Pap smear of cervix      Anxiety      Binge eating disorder       on wellbutrin    COVID-19 07/15/2022             Date 23    Visit Number 2 3 4 5 6              Manual         P-A mobs         Anterior innominate mobilization   Abdominal stretching; soft cupping  3 fascial layers of abdominal stacking b/l    STM Scar massage to incisions  Scar massage to incisions  Pin and stretch abdominals b/l              Neuro Re-ed         TrA progression 10x 10x  3sx10     TA+ march 15x b/l 15x b/l       TA + SLR 20x b/l 15x b/l          TRX "staggered legs abdominal stretch 3*20 sec  360 breathing with SOS    Hip add squeeze         SLR \"rainbow\"         SOC    x10  2*10x     Bridge  20x + ADD squeeze 20x + ADD squeeze 20x + ADD squeeze 2x10 with add squeeze     Sciatic nerve sliders         SEVERO         Hip ABD/ ADD         Left hip flexion/ knee to chest         Prone press up         Thera Ex         CHARLY/REIL Standing extension over plinth 10x 10 sec hold CHARLY at plinth 2x10  2*10 x10     TB shoulder ext   2*10 12 5# 2x10 9#  2*10 9#    TB shoulder row   2*10 12 5# 2x10 9# 2*10 9#     Paloff press          Anti-rotation press   Step ups with various foot work 5 min  Step ups with various foot work 5 min     band \"pull the sword\"         Lateral walk          Sit to stand         Cat cow 20x 20x 20x 20x      cat to glutes on heels         Reciprocal gait with canes   4x50'        Leg circles         Arm banded, leg extension         LTR    2x10       Rec bike 6' NS x8' L1   NS x8' L1  rec bike 6 min lvl 3    Pointer          wisdom pose with walkout         Thera Activity         Patient education         Lifting mechanics         Posture education                                                           Modalities                                                          "

## 2023-05-02 ENCOUNTER — PREP FOR PROCEDURE (OUTPATIENT)
Dept: GYNECOLOGY | Facility: CLINIC | Age: 53
End: 2023-05-02

## 2023-05-02 ENCOUNTER — HOSPITAL ENCOUNTER (OUTPATIENT)
Facility: HOSPITAL | Age: 53
Setting detail: OUTPATIENT SURGERY
End: 2023-05-02
Attending: OBSTETRICS & GYNECOLOGY | Admitting: OBSTETRICS & GYNECOLOGY

## 2023-05-02 DIAGNOSIS — Z87.42 HISTORY OF POSTMENOPAUSAL BLEEDING: ICD-10-CM

## 2023-05-02 DIAGNOSIS — R93.89 THICKENED ENDOMETRIUM: Primary | ICD-10-CM

## 2023-05-03 ENCOUNTER — OFFICE VISIT (OUTPATIENT)
Dept: PHYSICAL THERAPY | Facility: CLINIC | Age: 53
End: 2023-05-03

## 2023-05-03 DIAGNOSIS — M54.2 NECK PAIN: ICD-10-CM

## 2023-05-03 DIAGNOSIS — Z98.890 S/P ABDOMINOPLASTY: Primary | ICD-10-CM

## 2023-05-03 NOTE — PROGRESS NOTES
Daily Note     Today's date: 5/3/2023  Patient name: Tiburcio Brice  : 1970  MRN: [id number]  Referring provider: Kye Bragg MD  Dx:   Encounter Diagnosis     ICD-10-CM    1  S/P abdominoplasty  Z98 890       2  Neck pain  M54 2           Start Time: 1500  Stop Time: 1540  Total time in clinic (min): 40 minutes    Subjective: Patient notes improvements in mobility of abdominal region after soft tissue performed last session  Objective: See treatment diary below      Assessment: Tolerated treatment well  Patient notes improvements in mobilization of abdominal region today, despite complaints of increased tightness within scar region  Patient is progressing well in terms of core strength  Will continue to progress as able  Patient would benefit from continued PT       Plan: Continue per plan of care             Insurance:  AMA/CMS Eval/ Re-eval POC expires Hemalatha Angry #/ Referral # Total   Visits  Start date  Expiration date Extension  Visit limitation? PT only or  PT+OT?  Co-Insurance   BCBS   Auth submitted     BOMN PT No                                                                 AUTH #: Approved Date 4/12 4/17 4/19 4/24 4/26 5/1 5/3        Visits  Authed: 12 Used 1 1 1 1 1 1 1         Remaining  11 10 9 8 7 6 5                Precautions: standard  Medical History        Past Medical History:   Diagnosis Date    Abnormal Pap smear of cervix      Anxiety      Binge eating disorder       on wellbutrin    COVID-19 07/15/2022             Date 4/17/23 4/19/23 4/24/23 4/26/23 5/1/23 5/3/23   Visit Number 2 3 4 5 6 7             Manual         P-A mobs         Anterior innominate mobilization   Abdominal stretching; soft cupping  3 fascial layers of abdominal stacking b/l    STM Scar massage to incisions  Scar massage to incisions  Pin and stretch abdominals b/l Pin and stretch abdominals b/l             Neuro Re-ed         TrA progression 10x 10x  3sx10  3sx10   TA+ march 15x b/l 15x "b/l       TA + SLR 20x b/l 15x b/l          TRX staggered legs abdominal stretch 3*20 sec  360 breathing with SOS    Hip add squeeze         SLR \"rainbow\"         SOC    x10  2*10x  2x10   Bridge  20x + ADD squeeze 20x + ADD squeeze 20x + ADD squeeze 2x10 with add squeeze 2x10 with add squeeze 2x10 with add squeeze   Hooklying clamshells       2x10 GTB   SEVERO         Hip ABD/ ADD         Left hip flexion/ knee to chest         Prone press up         Thera Ex         CHARLY/REIL Standing extension over plinth 10x 10 sec hold CHARLY at plinth 2x10  2*10 x10     TB shoulder ext   2*10 12 5# 2x10 9#  2*10 9# 2*10 9#   TB shoulder row   2*10 12 5# 2x10 9# 2*10 9#  2*10 9#    Paloff press          Anti-rotation press   Step ups with various foot work 5 min  Step ups with various foot work 5 min     band \"pull the sword\"         Lateral walk          Sit to stand         Cat cow 20x 20x 20x 20x      cat to glutes on heels         Reciprocal gait with canes   4x50'     CC chops x10 B 7 5#   KB swings      2x10 9#    Step hip flexor stretch       10x10s B    LTR    2x10       Rec bike 6' NS x8' L1   NS x8' L1  rec bike 6 min lvl 3    Pointer          wisdom pose with walkout         Thera Activity         Patient education         Lifting mechanics         Posture education                                                           Modalities                                                          "

## 2023-05-05 ENCOUNTER — TELEPHONE (OUTPATIENT)
Dept: OBGYN CLINIC | Facility: CLINIC | Age: 53
End: 2023-05-05

## 2023-05-05 NOTE — TELEPHONE ENCOUNTER
Called [facility] and spoke to Rayvon Buerger in regards to starting prior auth for MRI pelvis with and w/o contrast    CPT code: 95262  DX code: R93 [id number], Z87 42    As per Miss Lakhwinder Dyer will be forwarded for physician review      Case # [id number]  Pt is scheduled for MRI at [place] [address] 5/15/2023    Leann MYERS/PILAR

## 2023-05-08 ENCOUNTER — OFFICE VISIT (OUTPATIENT)
Dept: PHYSICAL THERAPY | Facility: CLINIC | Age: 53
End: 2023-05-08

## 2023-05-08 DIAGNOSIS — M54.2 NECK PAIN: ICD-10-CM

## 2023-05-08 DIAGNOSIS — Z98.890 S/P ABDOMINOPLASTY: Primary | ICD-10-CM

## 2023-05-08 NOTE — PROGRESS NOTES
Daily Note     Today's date: 2023  Patient name: Yi Rajan  : 1970  MRN: [id number]  Referring provider: Jeff Wilcox MD  Dx:   Encounter Diagnosis     ICD-10-CM    1  S/P abdominoplasty  Z98 890       2  Neck pain  M54 2           Start Time: 1545  Stop Time: 1630  Total time in clinic (min): 45 minutes    Subjective: Patient reports that she is feeling tight in abdomen and it made her grumpy over the weekend      Objective: See treatment diary below      Assessment: Tolerated treatment well  Patient would benefit from continued PT in order to increase diaphragmatic excursion and increase abdominal tissue extensibility  Did well with pin and stretch with scar tissue STM of horizontal scar  Had decreased pain and increased ability to stand straight  Plan: Continue per plan of care             Insurance:  AMA/CMS Eval/ Re-eval POC expires [facility] #/ Referral # Total   Visits  Start date  Expiration date Extension  Visit limitation? PT only or  PT+OT?  Co-Insurance   BCBS   Auth submitted     BOMN PT No                                                                 AUTH #: Approved Date 4/12 4/17 4/19 4/24 4/26 5/1 5/3 5/8       Visits  Authed: 12 Used 1 1 1 1 1 1 1 1        Remaining  11 10 9 8 7 6 5 4               Precautions: standard  Medical History        Past Medical History:   Diagnosis Date   • Abnormal Pap smear of cervix     • Anxiety     • Binge eating disorder       on wellbutrin   • COVID-19 07/15/2022             Date 4/17/23 4/19/23 4/24/23 4/26/23 5/1/23 5/3/23 5/8/23   Visit Number 2 3 4 5 6 7 8              Manual          P-A mobs          Anterior innominate mobilization   Abdominal stretching; soft cupping  3 fascial layers of abdominal stacking b/l   3fascial layers of abdominal stacking b/l      STM Scar massage to incisions  Scar massage to incisions  Pin and stretch abdominals b/l Pin and stretch abdominals b/l Pin and stretch abdominals with scar tissue "massage              Neuro Re-ed          TrA progression 10x 10x  3sx10  3sx10    TA+ march 15x b/l 15x b/l        TA + SLR 20x b/l 15x b/l           TRX staggered legs abdominal stretch 3*20 sec  360 breathing with SOS  Balloon breath training   Hip add squeeze       Various step exercises with arm raises to open chest 7'   SLR \"rainbow\"          SOC    x10  2*10x  2x10 2*10   Bridge  20x + ADD squeeze 20x + ADD squeeze 20x + ADD squeeze 2x10 with add squeeze 2x10 with add squeeze 2x10 with add squeeze 2*10 w ADD squeeze   Hooklying clamshells       2x10 GTB    SEVERO          Hip ABD/ ADD          Left hip flexion/ knee to chest          Prone press up          Thera Ex          CHARLY/REIL Standing extension over plinth 10x 10 sec hold CHARLY at plinth 2x10  2*10 x10      TB shoulder ext   2*10 12 5# 2x10 9#  2*10 9# 2*10 9#    TB shoulder row   2*10 12 5# 2x10 9# 2*10 9#  2*10 9#     Paloff press           Anti-rotation press   Step ups with various foot work 5 min  Step ups with various foot work 5 min      band \"pull the sword\"          Lateral walk           Sit to stand          Cat cow 20x 20x 20x 20x       cat to glutes on heels          Reciprocal gait with canes   4x50'     CC chops x10 B 7 5#    KB swings      2x10 9#     Step hip flexor stretch       10x10s B     LTR    2x10        Rec bike 6' NS x8' L1   NS x8' L1  rec bike 6 min lvl 3     Pointer           wisdom pose with walkout          Thera Activity          Patient education          Lifting mechanics          Posture education                                                                 Modalities                                                              "

## 2023-05-09 DIAGNOSIS — F41.9 ANXIETY: ICD-10-CM

## 2023-05-09 RX ORDER — LORAZEPAM 1 MG/1
1 TABLET ORAL DAILY PRN
Qty: 15 TABLET | Refills: 0 | Status: SHIPPED | OUTPATIENT
Start: 2023-05-09

## 2023-05-11 ENCOUNTER — OFFICE VISIT (OUTPATIENT)
Dept: PHYSICAL THERAPY | Facility: CLINIC | Age: 53
End: 2023-05-11

## 2023-05-11 DIAGNOSIS — M54.2 NECK PAIN: ICD-10-CM

## 2023-05-11 DIAGNOSIS — Z98.890 S/P ABDOMINOPLASTY: Primary | ICD-10-CM

## 2023-05-11 NOTE — PROGRESS NOTES
Daily Note     Today's date: 2023  Patient name: Vera Burns  : 1970  MRN: [id number]  Referring provider: Yenifer Holloway MD  Dx:   Encounter Diagnosis     ICD-10-CM    1  S/P abdominoplasty  Z98 890       2  Neck pain  M54 2           Start Time: 1530  Stop Time: 1615  Total time in clinic (min): 45 minutes    Subjective: Reports feeling tight through abdomen  Objective: See treatment diary below      Assessment: Tolerated treatment well  Patient would benefit from continued PT in order to engage transverse abdominus and increase tissue extensibility of lower abdominals  Responded well to STM of rectus for DR repair  Cannot sit up from table without rolling onto side and pushing up with arms  Plan: Continue per plan of care             Insurance:  AMA/CMS Eval/ Re-eval POC expires [facility] #/ Referral # Total   Visits  Start date  Expiration date Extension  Visit limitation? PT only or  PT+OT?  Co-Insurance   BCBS   Auth submitted     BOMN PT No                                                                 AUTH #: Approved Date 4/12 4/17 4/19 4/24 4/26 5/1 5/3 5/8 5/11      Visits  Authed: 12 Used 1 1 1 1 1 1 1 1 1       Remaining  11 10 9 8 7 6 5 4 3              Precautions: standard  Medical History        Past Medical History:   Diagnosis Date   • Abnormal Pap smear of cervix     • Anxiety     • Binge eating disorder       on wellbutrin   • COVID-19 07/15/2022             Date 4/24/23 4/26/23 5/1/23 5/3/23 5/8/23 5/11/23    Visit Number 4 5 6 7 8 9               Manual          P-A mobs          Anterior innominate mobilization Abdominal stretching; soft cupping  3 fascial layers of abdominal stacking b/l   3fascial layers of abdominal stacking b/l        STM Scar massage to incisions  Pin and stretch abdominals b/l Pin and stretch abdominals b/l Pin and stretch abdominals with scar tissue massage pin and stretch abdominals with scar tissue massage               Neuro "Re-ed          TrA progression  3sx10  3sx10      TA+ march          TA + SLR           TRX staggered legs abdominal stretch 3*20 sec  360 breathing with SOS  Balloon breath training Standing lat pulldown with back extension BTB 25x    Hip add squeeze     Various step exercises with arm raises to open chest 7' TA engagement 10x+   March + TA     SLR \"rainbow\"          SOC  x10  2*10x  2x10 2*10     Bridge  20x + ADD squeeze 2x10 with add squeeze 2x10 with add squeeze 2x10 with add squeeze 2*10 w ADD squeeze 2*10 with ADD squeeze    Hooklying clamshells     2x10 GTB  step ups with various foot work 5 min with arm openers    SEVERO          Hip ABD/ ADD          Left hip flexion/ knee to chest      Foot on step behind, back ext with arm stretch     Prone press up          Thera Ex          CHARLY/REIL 2*10 x10        TB shoulder ext 2*10 12 5# 2x10 9#  2*10 9# 2*10 9#      TB shoulder row 2*10 12 5# 2x10 9# 2*10 9#  2*10 9#       Paloff press           Anti-rotation press Step ups with various foot work 5 min  Step ups with various foot work 5 min        band \"pull the sword\"          Lateral walk           Sit to stand          Cat cow 20x 20x         cat to glutes on heels          Reciprocal gait with canes     CC chops x10 B 7 5#      KB swings    2x10 9#       Step hip flexor stretch     10x10s B       LTR  2x10           NS x8' L1  rec bike 6 min lvl 3       Pointer           wisdom pose with walkout          Thera Activity          Patient education          Lifting mechanics          Posture education                                                                 Modalities                                                                "

## 2023-05-12 ENCOUNTER — TELEPHONE (OUTPATIENT)
Dept: GYNECOLOGIC ONCOLOGY | Facility: CLINIC | Age: 53
End: 2023-05-12

## 2023-05-12 ENCOUNTER — TELEPHONE (OUTPATIENT)
Dept: OBGYN CLINIC | Facility: CLINIC | Age: 53
End: 2023-05-12

## 2023-05-12 NOTE — TELEPHONE ENCOUNTER
PC to patient to discuss recent MRI denial by insurance company  Left patient message that she can try to reach me through answering service tonight or we can further discuss at her upcoming appointment

## 2023-05-12 NOTE — TELEPHONE ENCOUNTER
Patient called in and reported that she called on Tuesday about a referral that was placed and no one has called her back  Patient had to get off the phone due to another phone call about her mother in the hospital  I told her that I would look into it and someone will call her back later

## 2023-05-16 ENCOUNTER — OFFICE VISIT (OUTPATIENT)
Dept: PHYSICAL THERAPY | Facility: CLINIC | Age: 53
End: 2023-05-16

## 2023-05-16 DIAGNOSIS — Z98.890 S/P ABDOMINOPLASTY: Primary | ICD-10-CM

## 2023-05-16 DIAGNOSIS — M54.2 NECK PAIN: ICD-10-CM

## 2023-05-16 NOTE — PROGRESS NOTES
Daily Note     Today's date: 2023  Patient name: Suad Hamilton  : 1970  MRN: [id number]  Referring provider: Ronal Sanders MD  Dx:   Encounter Diagnosis     ICD-10-CM    1  S/P abdominoplasty  Z98 890       2  Neck pain  M54 2           Start Time: 1500  Stop Time: 1545  Total time in clinic (min): 45 minutes    Subjective: Patient noted slight improvements in her mobility after last session  Objective: See treatment diary below      Assessment: Tolerated treatment well  Patient is progressing well at this time, with no increased pain noted throughout session  She is progressing well in terms of ROM and strength of her core musculature  No improvements in mobility of abdominal region noted post tx  Patient would benefit from continued PT     Plan: Continue per plan of care             Insurance:  AMA/CMS Eval/ Re-eval POC expires [facility] #/ Referral # Total   Visits  Start date  Expiration date Extension  Visit limitation? PT only or  PT+OT?  Co-Insurance   BCBS   Auth submitted     BOMN PT No                                                                 AUTH #: Approved Date 4/12 4/17 4/19 4/24 4/26 5/1 5/3 5/8 5/11 5/16     Visits  Authed: 12 Used 1 1 1 1 1 1 1 1 1 1      Remaining  11 10 9 8 7 6 5 4 3 2             Precautions: standard  Medical History        Past Medical History:   Diagnosis Date   • Abnormal Pap smear of cervix     • Anxiety     • Binge eating disorder       on wellbutrin   • COVID-19 07/15/2022             Date 4/24/23 4/26/23 5/1/23 5/3/23 5/8/23 5/11/23 5/16/23   Visit Number 4 5 6 7 8 9 10              Manual          P-A mobs          Anterior innominate mobilization Abdominal stretching; soft cupping  3 fascial layers of abdominal stacking b/l   3fascial layers of abdominal stacking b/l        STM Scar massage to incisions  Pin and stretch abdominals b/l Pin and stretch abdominals b/l Pin and stretch abdominals with scar tissue massage pin and stretch "abdominals with scar tissue massage pin and stretch abdominals with scar tissue massage              Neuro Re-ed          TrA progression  3sx10  3sx10      TA+ march          TA + SLR           TRX staggered legs abdominal stretch 3*20 sec  360 breathing with SOS  Balloon breath training Standing lat pulldown with back extension BTB 25x Standing lat pulldown with back extension BTB 25x   Hip add squeeze     Various step exercises with arm raises to open chest 7' TA engagement 10x+   March + TA  TA engagement 10x+   March + TA    SLR \"rainbow\"          SOC  x10  2*10x  2x10 2*10     Bridge  20x + ADD squeeze 2x10 with add squeeze 2x10 with add squeeze 2x10 with add squeeze 2*10 w ADD squeeze 2*10 with ADD squeeze 2*10 with ADD squeeze   Hooklying clamshells     2x10 GTB  step ups with various foot work 5 min with arm openers    SEVERO          Hip ABD/ ADD       SL clamshells 2x10 B    Left hip flexion/ knee to chest      Foot on step behind, back ext with arm stretch     Prone press up          Thera Ex          CHARLY/REIL 2*10 x10        TB shoulder ext 2*10 12 5# 2x10 9#  2*10 9# 2*10 9#      TB shoulder row 2*10 12 5# 2x10 9# 2*10 9#  2*10 9#       Paloff press           Anti-rotation press Step ups with various foot work 5 min  Step ups with various foot work 5 min        band \"pull the sword\"          Lateral walk           Sit to stand          Cat cow 20x 20x         cat to glutes on heels       TRX supermans x10    Reciprocal gait with canes     CC chops x10 B 7 5#   BTB chops 2x10 B   KB swings    2x10 9#    KB squats with 18# KB 2x10    Step hip flexor stretch     10x10s B       LTR  2x10           NS x8' L1  rec bike 6 min lvl 3       Pointer           wisdom pose with walkout          Thera Activity          Patient education          Lifting mechanics          Posture education                                                                 Modalities                                         "

## 2023-05-16 NOTE — TELEPHONE ENCOUNTER
Pt called back after we left her a message - She states she is aware of the denied MRI and that you think it would be beneficial but not necessary but she still would like to go for the MRI  She is also going for a second opinion with another doctor in regards to this

## 2023-05-18 ENCOUNTER — APPOINTMENT (OUTPATIENT)
Dept: PHYSICAL THERAPY | Facility: CLINIC | Age: 53
End: 2023-05-18
Payer: COMMERCIAL

## 2023-05-18 ENCOUNTER — EVALUATION (OUTPATIENT)
Dept: PHYSICAL THERAPY | Facility: CLINIC | Age: 53
End: 2023-05-18

## 2023-05-18 DIAGNOSIS — M54.2 NECK PAIN: ICD-10-CM

## 2023-05-18 DIAGNOSIS — Z98.890 S/P ABDOMINOPLASTY: Primary | ICD-10-CM

## 2023-05-18 NOTE — PROGRESS NOTES
Daily Note     Today's date: 2023  Patient name: Ariella Seals  : 1970  MRN: [id number]  Referring provider: Alexa Reyes MD  Dx:   Encounter Diagnosis     ICD-10-CM    1  S/P abdominoplasty  Z98 [id number]       2  Neck pain  M54 2           Assessment  Assessment details: Patient is a 47 y o  Female who presents to skilled outpatient PT with referring diagnosis of S/P abdominoplasty, neck pain  Primary movement impairment diagnosis decreased ROM, scar tissue around abdominal region resulting in decreased ROM, and impaired strength resulting in pathoanatomical symptoms of tightness of abdominal wall and cervical spine pain  Patient reports 50% improvement of abdominal sxs  The aforementioned impairments have limited the patient's ability to stand and walk greater than 30 minutes, sit for prolonged periods of time at work  Patient education performed during today's session included: HEP as noted on flow sheet, nature of lumbar radiculopathy, and postural education   Patient verbalized understanding of POC      Impairments: Abnormal or restricted ROM, Activity intolerance, Impaired physical strength, Lacks appropriate HEP, Poor posture, Poor body mechanics and Pain with function  Understanding of Dx/Px/POC: Excellent  Prognosis: Excellent        Plan  Patient would benefit from: PT Eval and Skilled PT  Planned modality interventions: Biofeedback, Cryotherapy, TENS, Thermotherapy: Hydrocollator Packs and Traction  Planned therapy interventions: Abdominal trunk stabilization, ADL training, Balance, Balance/WB training, Body mechanics training, Coordination, Functional ROM exercises, Gait training, HEP, Joint mobilization, Manual therapy, Luo taping, Neuromuscular re-education, Patient education, Postural training, Strengthening, Stretching, Therapeutic activities, Therapeutic exercises, Therapeutic training, Activity modification and Work reintegration  Frequency: 2x/week  Duration in weeks: 12  Plan of "Care beginning date: 23  Plan of Care expiration date: 12 weeks - 2023  Treatment plan discussed with: Patient        Goals  Short Term Goals (4 weeks): All met as of of 23  - Patient will be independent in basic HEP 2-3 weeks  - Patient will have 0/10 pain at rest  - Patient will demonstrate >1/3 improvement in MMT grade as applicable  - Patient functional goal: Patient will stand and walk greater than 30 minutes with no discomfort within abdominal region       Long Term Goals (8 weeks): Progressing as of 23  - Patient will be independent in a comprehensive home exercise program  - Patient FOTO score will improve by 10 points  - Patient will self-report >75% improvement in function  - Patient functional goal: Patient will resume devyn classes          Subjective     History of Present Illness  - Mechanism of injury: Patient reports s/p abdominoplasty on 2022  She continues to note tightness of her abdominal region and B hip region  She has to walk at times standing over due to the tightness  She is worried about stretching her abdominal region since her surgery  She also has a history of lumbar spine pain that has been resolved since she came to PT in the fall of   She denies difficulties with standing, sitting  Patient is a teacher at the high school  No difficulties with sleeping  She has numbness around her incision       Update: 23: reports 50% improvement since coming to physical therapy  Is getting second opinion for hysterectomy  Still feeling very tight abdominally  Patient also reports increased shoulder/neck pain when she is sitting for prolonged periods of time       - Functional limitations: prolonged walking, walking up inclines, stair navigation     - Patient goals: \"noticeable difference with stomach  \"      Pain  - Current pain rating: 3/10  - At best pain ratin/10  - At worst pain ratin/10  - Location: abdominal region  - Alleviating factors: " nothing     - Current pain rating: 3/10  - At best pain ratin/10  - At worst pain ratin/10  - Location: neck/shoulders   - Alleviating factors: nothing          Objective   LE MMT     L Hip Flexion: 55             R Hip Flexion: 5/5   L Hip Extension: /5 R Hip Extension: 5/5   L Hip Abduction: / R Hip Abduction: /5   L Hip Adduction: /5 R Hip Adduction: /5   L Knee Extension:  R Knee Extension: /5   L Knee Flexion:  R Knee Flexion: 5/5   L Ankle DF: 5 R Ankle DF: 5/5   L Ankle PF: 55             R Ankle PF: /5   L Ankle IV: 5             L Ankle IV:    L Ankle EV:              L Ankle EV:             Movement Loss Arsalan Mod Min Nil Symptoms   Flexion     X   Tightness in abdominal region    Extension X       Tightness in abdominal region    Side gliding R     X   Pulling of abdominal region    Side gliding L     X   Pulling of abdominal region    Other                Sensation  - Light touch: intact      Palpation   -TTP rectus abdominis, incision on abdominal region, B UT, B SCM           Cervical AROM     Flexion 100%   Extension 100%   Side bending R 50%   Side bending L  50%   Rotation R 50%   Rotation L 100%      Repeated motion assessment     Repeated retraction Centralized, abolished burning sensation across shoulders   Repeated protraction NT   Repeated retraction extension  NT   Repeated extension  NT   Repeated L lateral flexion  NT   Repeated R lateral flexion  NT   Sustained protraction NT                                Insurance:  AMA/CMS Eval/ Re-eval POC expires Yue Goldstein #/ Referral # Total   Visits  Start date  Expiration date Extension  Visit limitation? PT only or  PT+OT?  Co-Insurance   BCBS   Auth submitted     BOMN PT No                                                                 AUTH #: Approved Date 4/12 4/17 4/19 4/24 4/26 5/1 5/3 5/8 5/11 5/16     Visits  Authed: 12 Used 1 1 1 1 1 1 1 1 1 1      Remaining  11 10 9 8 7 6 5 4 3 2 "       Precautions: standard  Medical History        Past Medical History:   Diagnosis Date   • Abnormal Pap smear of cervix     • Anxiety     • Binge eating disorder       on wellbutrin   • COVID-19 07/15/2022             Date 4/24/23 4/26/23 5/1/23 5/3/23 5/8/23 5/11/23 5/16/23   Visit Number 4 5 6 7 8 9 10              Manual          P-A mobs          Anterior innominate mobilization Abdominal stretching; soft cupping  3 fascial layers of abdominal stacking b/l   3fascial layers of abdominal stacking b/l        STM Scar massage to incisions  Pin and stretch abdominals b/l Pin and stretch abdominals b/l Pin and stretch abdominals with scar tissue massage pin and stretch abdominals with scar tissue massage pin and stretch abdominals with scar tissue massage              Neuro Re-ed          TrA progression  3sx10  3sx10      TA+ march          TA + SLR           TRX staggered legs abdominal stretch 3*20 sec  360 breathing with SOS  Balloon breath training Standing lat pulldown with back extension BTB 25x Standing lat pulldown with back extension BTB 25x   Hip add squeeze     Various step exercises with arm raises to open chest 7' TA engagement 10x+   March + TA  TA engagement 10x+   March + TA    SLR \"rainbow\"          SOC  x10  2*10x  2x10 2*10     Bridge  20x + ADD squeeze 2x10 with add squeeze 2x10 with add squeeze 2x10 with add squeeze 2*10 w ADD squeeze 2*10 with ADD squeeze 2*10 with ADD squeeze   Hooklying clamshells     2x10 GTB  step ups with various foot work 5 min with arm openers    SEVERO          Hip ABD/ ADD       SL clamshells 2x10 B    Left hip flexion/ knee to chest      Foot on step behind, back ext with arm stretch     Prone press up          Thera Ex          CHARLY/REIL 2*10 x10        TB shoulder ext 2*10 12 5# 2x10 9#  2*10 9# 2*10 9#      TB shoulder row 2*10 12 5# 2x10 9# 2*10 9#  2*10 9#       Paloff press           Anti-rotation press Step ups with various foot work 5 min  Step ups with " "various foot work 5 min        band \"pull the sword\"          Lateral walk           Sit to stand          Cat cow 20x 20x         cat to glutes on heels       TRX supermans x10    Reciprocal gait with canes     CC chops x10 B 7 5#   BTB chops 2x10 B   KB swings    2x10 9#    KB squats with 18# KB 2x10    Step hip flexor stretch     10x10s B       LTR  2x10           NS x8' L1  rec bike 6 min lvl 3       Pointer           wisdom pose with walkout          Thera Activity          Patient education          Lifting mechanics          Posture education                                                                 Modalities                                                                            Insurance:  AMA/CMS Eval/ Re-eval POC expires Mel Worthy #/ Referral # Total   Visits  Start date  Expiration date Extension  Visit limitation? PT only or  PT+OT?  Co-Insurance   BCBS 4/12 7/5  Auth submitted  4/12   BOMN PT No                                                                 AUTH #: Approved Date 4/12 4/17 4/19 4/24 4/26 5/1 5/3 5/8 5/11 5/16 5/18    Visits  Authed: 12 Used 1 1 1 1 1 1 1 1 1 1 1     Remaining  11 10 9 8 7 6 5 4 3 2 1            Precautions: standard  Medical History        Past Medical History:   Diagnosis Date   • Abnormal Pap smear of cervix     • Anxiety     • Binge eating disorder       on wellbutrin   • COVID-19 07/15/2022             Date 4/24/23 4/26/23 5/1/23 5/3/23 5/8/23 5/11/23 5/16/23 5/18   Visit Number 4 5 6 7 8 9 10                Manual           P-A mobs           Anterior innominate mobilization Abdominal stretching; soft cupping  3 fascial layers of abdominal stacking b/l   3fascial layers of abdominal stacking b/l         STM Scar massage to incisions  Pin and stretch abdominals b/l Pin and stretch abdominals b/l Pin and stretch abdominals with scar tissue massage pin and stretch abdominals with scar tissue massage pin and stretch abdominals with scar tissue massage pin " "and stretch abdominals with scar tissue massage               Neuro Re-ed           TrA progression  3sx10  3sx10       TA+ march           TA + SLR            TRX staggered legs abdominal stretch 3*20 sec  360 breathing with SOS  Balloon breath training Standing lat pulldown with back extension BTB 25x Standing lat pulldown with back extension BTB 25x Standing lat pulldown with back ext 12 5# 25x   Hip add squeeze     Various step exercises with arm raises to open chest 7' TA engagement 10x+   March + TA  TA engagement 10x+   March + TA     SLR \"rainbow\"           SOC  x10  2*10x  2x10 2*10      Bridge  20x + ADD squeeze 2x10 with add squeeze 2x10 with add squeeze 2x10 with add squeeze 2*10 w ADD squeeze 2*10 with ADD squeeze 2*10 with ADD squeeze    Hooklying clamshells     2x10 GTB  step ups with various foot work 5 min with arm openers  step ups with various foot work 5 min with arm openers   SEVERO           Hip ABD/ ADD       SL clamshells 2x10 B     Left hip flexion/ knee to chest      Foot on step behind, back ext with arm stretch      Prone press up           Thera Ex           CHARLY/REIL 2*10 x10         TB shoulder ext 2*10 12 5# 2x10 9#  2*10 9# 2*10 9#       TB shoulder row 2*10 12 5# 2x10 9# 2*10 9#  2*10 9#        Paloff press            Anti-rotation press Step ups with various foot work 5 min  Step ups with various foot work 5 min         band \"pull the sword\"           Lateral walk            Sit to stand           Cat cow 20x 20x          cat to glutes on heels       TRX supermans x10  TRX supermans x 10x2    Reciprocal gait with canes     CC chops x10 B 7 5#   BTB chops 2x10 B BTB chops 2*10   KB swings    2x10 9#    KB squats with 18# KB 2x10  KB squats w 18# 2*10   Step hip flexor stretch     10x10s B        LTR  2x10            NS x8' L1  rec bike 6 min lvl 3        Pointer            wisdom pose with walkout           Thera Activity           Patient education           Lifting mechanics         " Posture education                                                                       Modalities

## 2023-05-22 ENCOUNTER — OFFICE VISIT (OUTPATIENT)
Dept: PHYSICAL THERAPY | Facility: CLINIC | Age: 53
End: 2023-05-22

## 2023-05-22 ENCOUNTER — RA CDI HCC (OUTPATIENT)
Dept: OTHER | Facility: HOSPITAL | Age: 53
End: 2023-05-22

## 2023-05-22 DIAGNOSIS — Z98.890 S/P ABDOMINOPLASTY: Primary | ICD-10-CM

## 2023-05-22 DIAGNOSIS — M54.2 NECK PAIN: ICD-10-CM

## 2023-05-22 NOTE — PRE-PROCEDURE INSTRUCTIONS
Pre-Surgery Instructions:   Medication Instructions   • B Complex Vitamins (VITAMIN B COMPLEX PO) Stop taking 7 days prior to surgery  • buPROPion (WELLBUTRIN XL) 300 mg 24 hr tablet Take day of surgery  • clobetasol (TEMOVATE) 0 05 % cream Hold day of surgery  • Collagen-Boron-Hyaluronic Acid (Move Free IAMINTOIT Joint Wanderlust) 40-5-3 3 MG TABS Stop taking 7 days prior to surgery  • LORazepam (ATIVAN) 1 mg tablet Uses PRN- OK to take day of surgery   • multivitamin (THERAGRAN) TABS Stop taking 7 days prior to surgery  • vitamin A 2400 MCG (8000 UT) capsule Stop taking 7 days prior to surgery  Medication instructions for day surgery reviewed  Please use only a sip of water to take your instructed medications  Avoid all over the counter vitamins, supplements and NSAIDS for one week prior to surgery per anesthesia guidelines  Tylenol is ok to take as needed  You will receive a call one business day prior to surgery with an arrival time and hospital directions  If your surgery is scheduled on a Monday, the hospital will be calling you on the Friday prior to your surgery  If you have not heard from anyone by 8pm, please call the hospital supervisor through the hospital  at [phone]  [facility] Serum [phone])  Do not eat or drink anything after midnight the night before your surgery, including candy, mints, lifesavers, or chewing gum  Do not drink alcohol 24hrs before your surgery  Try not to smoke at least 24hrs before your surgery  Follow the pre surgery showering instructions as listed in the [facility] Surgical Experience Booklet” or otherwise provided by your surgeon's office  Do not shave the surgical area 24 hours before surgery  Do not apply any lotions, creams, including makeup, cologne, deodorant, or perfumes after showering on the day of your surgery  No contact lenses, eye make-up, or artificial eyelashes   Remove nail polish, including gel polish, and any artificial, gel, or acrylic nails if possible  Remove all jewelry including rings and body piercing jewelry  Wear causal clothing that is easy to take on and off  Consider your type of surgery  Keep any valuables, jewelry, piercings at home  Please bring any specially ordered equipment (sling, braces) if indicated  Arrange for a responsible person to drive you to and from the hospital on the day of your surgery  Visitor Guidelines discussed  Call the surgeon's office with any new illnesses, exposures, or additional questions prior to surgery  Please reference your [facility] Surgical Experience Booklet” for additional information to prepare for your upcoming surgery

## 2023-05-22 NOTE — PROGRESS NOTES
[facility] 75  coding opportunities       Chart reviewed, no opportunity found: CHART REVIEWED, NO OPPORTUNITY FOUND        Patients Insurance        Commercial Insurance: Aaron Page

## 2023-05-22 NOTE — PROGRESS NOTES
Hamilton Polo is a 47 y o   female here for a follow up visit  Patient with history of ablation and had episode of postmenopausal bleeding  Office endometrial biopsy was unsuccessful and patient had perforation with D&C but benign tissue at that time  Patient initially opted to pursue hysterectomy however she has had no further bleeding and cannot get refund on her cruise  Reviewed that we cannot completely confirm that tissue is normal there is suspected polyp on her ultrasound however we reviewed other options to monitor including watching her bleeding, repeat attempted endometrial biopsy, repeat ultrasound 1 year after her prior and continued observation  MRI has not been able to be approved by her insurance at this time we discussed that it may not add significant additional information to her current situation  After thoroughly discussing and counseling patient she desires to cancel hysterectomy at this time to reattempt endometrial biopsy with Dr Tonny Apley possibly with taking Cytotec prior and consider repeat ultrasound 1 year after her prior and to observe symptoms  Gynecologic History  No LMP recorded  Patient is premenopausal   Contraception: post menopausal status  Last Pap: 2022 positive high risk HPV  Last mammogram: 2023  Results were:  Follow-up ultrasound with benign cyst    Obstetric History  OB History    Para Term  AB Living   3 2 2   1 2   SAB IAB Ectopic Multiple Live Births           2      # Outcome Date GA Lbr [place]/2nd Weight Sex Delivery Anes PTL Lv   3 Term      Vag-Spont   DAINA   2 Term      Vag-Spont   DAINA   1 AB               Obstetric Comments   2 - uncomplicated pregnancies         The following portions of the patient's history were reviewed and updated as appropriate: allergies, current medications, past family history, past medical history, past social history, past surgical history and problem list     Review of "Systems  Review of Systems   Constitutional: Negative for chills and fever  HENT: Negative for ear pain and sore throat  Eyes: Negative for pain and visual disturbance  Respiratory: Negative for cough and shortness of breath  Cardiovascular: Negative for chest pain and palpitations  Gastrointestinal: Negative for abdominal pain and vomiting  Genitourinary: Negative for dysuria and hematuria  Musculoskeletal: Negative for arthralgias and back pain  Skin: Negative for color change and rash  Neurological: Negative for seizures and syncope  All other systems reviewed and are negative  Objective     /80 (BP Location: Right arm, Patient Position: Sitting)   Ht 5' 3\" (1 6 m)   Wt 60 8 kg (134 lb)   LMP 11/01/2022 (Approximate)   BMI 23 74 kg/m²   General appearance: alert and oriented, in no acute distress      Assessment  47year-old G3, P2 with prior postmenopausal bleeding that has currently resolved  Plan  Patient desires to reattempt endometrial biopsy she understands that this may still not get full tissue sampling  Consider repeat ultrasound 1 year after prior  Patient desires to cancel hysterectomy for now      "

## 2023-05-22 NOTE — PROGRESS NOTES
Daily Note     Today's date: 2023  Patient name: Marisol Foster  : 1970  MRN: [id number]  Referring provider: Juan Jose Villanueva MD  Dx:   Encounter Diagnosis     ICD-10-CM    1  S/P abdominoplasty  Z98 890       2  Neck pain  M54 2                      Subjective: Patient reports her tightness comes and goes  Objective: See treatment diary below      Assessment: Patient reports tightness increased at end of session  Plan: Continue per plan of care             Insurance:  AMA/CMS Eval/ Re-eval POC expires Keysha Keating #/ Referral # Total   Visits  Start date  Expiration date Extension  Visit limitation? PT only or  PT+OT?  Co-Insurance   BCBS   Auth submitted     BOMN PT No       [id number] 12  -                                                       AUTH #: Approved Date 4/12 4/17 4/19 4/24 4/26 5/1 5/3 5/8 5/11 5/16 5/22    Visits  Authed: 12 Used 1 1 1 1 1 1 1 1 1 1 1     Remaining  11 10 9 8 7 6 5 4 3 2 1         AUTH #: Approved Date               Visits  Authed: 12 Used                Remaining                     Precautions: standard  Medical History        Past Medical History:   Diagnosis Date   • Abnormal Pap smear of cervix     • Anxiety     • Binge eating disorder       on wellbutrin   • COVID-19 07/15/2022             Date 5/1/23 5/3/23 5/8/23 5/11/23 5/16/23 5/22/23   Visit Number 6 7 8 9 10 11             Manual         P-A mobs         Anterior innominate mobilization 3 fascial layers of abdominal stacking b/l   3fascial layers of abdominal stacking b/l         STM Pin and stretch abdominals b/l Pin and stretch abdominals b/l Pin and stretch abdominals with scar tissue massage pin and stretch abdominals with scar tissue massage pin and stretch abdominals with scar tissue massage pin and stretch abdominals with scar tissue massage             Neuro Re-ed         TrA progression  3sx10       TA+ march         TA + SLR          360 breathing with SOS  Balloon breath "training Standing lat pulldown with back extension BTB 25x Standing lat pulldown with back extension BTB 25x Standing lat pulldown with back extension BTB 25x   Hip add squeeze   Various step exercises with arm raises to open chest 7' TA engagement 10x+   March + TA  TA engagement 10x+   March + TA  TA engagement 10x+   March + TA    SLR \"rainbow\"         SOC 2*10x  2x10 2*10      Bridge  2x10 with add squeeze 2x10 with add squeeze 2*10 w ADD squeeze 2*10 with ADD squeeze 2*10 with ADD squeeze 2*10 w/ ADD squeeze   Hooklying clamshells   2x10 GTB  step ups with various foot work 5 min with arm openers  step ups with various foot work 5 min with arm openers   SEVERO         Hip ABD/ ADD     SL clamshells 2x10 B  SL clamshells 2x10 B    Left hip flexion/ knee to chest    Foot on step behind, back ext with arm stretch   Foot on step behind, back ext with arm stretch 2x10   Prone press up         Thera Ex         CHARLY/REIL         TB shoulder ext 2*10 9# 2*10 9#       TB shoulder row 2*10 9#  2*10 9#        Paloff press          Anti-rotation press Step ups with various foot work 5 min         band \"pull the sword\"         Lateral walk          Sit to stand         Cat cow          cat to glutes on heels     TRX supermans x10  TRX supermans x10    Reciprocal gait with canes   CC chops x10 B 7 5#   BTB chops 2x10 B BTB chops 2x10   KB swings  2x10 9#    KB squats with 18# KB 2x10  KB squats w/ 18# KN 2x10   Step hip flexor stretch   10x10s B        LTR          rec bike 6 min lvl 3        Pointer          wisdom pose with walkout         Thera Activity         Patient education         Lifting mechanics         Posture education                                                           Modalities                                                                         Insurance:  AMA/CMS Eval/ Re-eval POC expires Keysha Keating #/ Referral # Total   Visits  Start date  Expiration date Extension  Visit limitation?   PT only " "or  PT+OT?  Co-Insurance   BCBS 4/12 7/5  Auth submitted  4/12   BOMN PT No                                                                 AUTH #: Approved Date 4/12 4/17 4/19 4/24 4/26 5/1 5/3 5/8 5/11 5/16 5/18    Visits  Authed: 12 Used 1 1 1 1 1 1 1 1 1 1 1     Remaining  11 10 9 8 7 6 5 4 3 2 1            Precautions: standard  Medical History        Past Medical History:   Diagnosis Date   • Abnormal Pap smear of cervix     • Anxiety     • Binge eating disorder       on wellbutrin   • COVID-19 07/15/2022             Date 4/24/23 4/26/23 5/1/23 5/3/23 5/8/23 5/11/23 5/16/23 5/18   Visit Number 4 5 6 7 8 9 10                Manual           P-A mobs           Anterior innominate mobilization Abdominal stretching; soft cupping  3 fascial layers of abdominal stacking b/l   3fascial layers of abdominal stacking b/l         STM Scar massage to incisions  Pin and stretch abdominals b/l Pin and stretch abdominals b/l Pin and stretch abdominals with scar tissue massage pin and stretch abdominals with scar tissue massage pin and stretch abdominals with scar tissue massage pin and stretch abdominals with scar tissue massage               Neuro Re-ed           TrA progression  3sx10  3sx10       TA+ march           TA + SLR            TRX staggered legs abdominal stretch 3*20 sec  360 breathing with SOS  Balloon breath training Standing lat pulldown with back extension BTB 25x Standing lat pulldown with back extension BTB 25x Standing lat pulldown with back ext 12 5# 25x   Hip add squeeze     Various step exercises with arm raises to open chest 7' TA engagement 10x+   March + TA  TA engagement 10x+   March + TA     SLR \"rainbow\"           SOC  x10  2*10x  2x10 2*10      Bridge  20x + ADD squeeze 2x10 with add squeeze 2x10 with add squeeze 2x10 with add squeeze 2*10 w ADD squeeze 2*10 with ADD squeeze 2*10 with ADD squeeze    Hooklying clamshells     2x10 GTB  step ups with various foot work 5 min with arm openers  step " "ups with various foot work 5 min with arm openers   SEVERO           Hip ABD/ ADD       SL clamshells 2x10 B     Left hip flexion/ knee to chest      Foot on step behind, back ext with arm stretch      Prone press up           Thera Ex           CHARLY/REIL 2*10 x10         TB shoulder ext 2*10 12 5# 2x10 9#  2*10 9# 2*10 9#       TB shoulder row 2*10 12 5# 2x10 9# 2*10 9#  2*10 9#        Paloff press            Anti-rotation press Step ups with various foot work 5 min  Step ups with various foot work 5 min         band \"pull the sword\"           Lateral walk            Sit to stand           Cat cow 20x 20x          cat to glutes on heels       TRX supermans x10  TRX supermans x 10x2    Reciprocal gait with canes     CC chops x10 B 7 5#   BTB chops 2x10 B BTB chops 2*10   KB swings    2x10 9#    KB squats with 18# KB 2x10  KB squats w 18# 2*10   Step hip flexor stretch     10x10s B        LTR  2x10            NS x8' L1  rec bike 6 min lvl 3        Pointer            wisdom pose with walkout           Thera Activity           Patient education           Lifting mechanics           Posture education                                                                       Modalities                                                                      "

## 2023-05-23 ENCOUNTER — APPOINTMENT (OUTPATIENT)
Dept: LAB | Facility: HOSPITAL | Age: 53
End: 2023-05-23

## 2023-05-23 ENCOUNTER — APPOINTMENT (OUTPATIENT)
Dept: LAB | Facility: HOSPITAL | Age: 53
End: 2023-05-23
Attending: OBSTETRICS & GYNECOLOGY

## 2023-05-23 ENCOUNTER — OFFICE VISIT (OUTPATIENT)
Dept: OBGYN CLINIC | Facility: CLINIC | Age: 53
End: 2023-05-23

## 2023-05-23 VITALS
BODY MASS INDEX: 23.74 KG/M2 | DIASTOLIC BLOOD PRESSURE: 80 MMHG | SYSTOLIC BLOOD PRESSURE: 124 MMHG | HEIGHT: 63 IN | WEIGHT: 134 LBS

## 2023-05-23 DIAGNOSIS — R93.89 THICKENED ENDOMETRIUM: ICD-10-CM

## 2023-05-23 DIAGNOSIS — Z87.42 HISTORY OF POSTMENOPAUSAL BLEEDING: ICD-10-CM

## 2023-05-23 DIAGNOSIS — N95.0 POSTMENOPAUSAL BLEEDING: Primary | ICD-10-CM

## 2023-05-23 LAB
ABO GROUP BLD: NORMAL
ALBUMIN SERPL BCP-MCNC: 4.3 G/DL (ref 3.5–5)
ALP SERPL-CCNC: 79 U/L (ref 34–104)
ALT SERPL W P-5'-P-CCNC: 22 U/L (ref 7–52)
ANION GAP SERPL CALCULATED.3IONS-SCNC: 7 MMOL/L (ref 4–13)
AST SERPL W P-5'-P-CCNC: 22 U/L (ref 13–39)
BASOPHILS # BLD AUTO: 0.07 THOUSANDS/ÂΜL (ref 0–0.1)
BASOPHILS NFR BLD AUTO: 2 % (ref 0–1)
BILIRUB SERPL-MCNC: 0.59 MG/DL (ref 0.2–1)
BLD GP AB SCN SERPL QL: NEGATIVE
BUN SERPL-MCNC: 8 MG/DL (ref 5–25)
CALCIUM SERPL-MCNC: 9.5 MG/DL (ref 8.4–10.2)
CHLORIDE SERPL-SCNC: 107 MMOL/L (ref 96–108)
CO2 SERPL-SCNC: 27 MMOL/L (ref 21–32)
CREAT SERPL-MCNC: 0.65 MG/DL (ref 0.6–1.3)
EOSINOPHIL # BLD AUTO: 0.21 THOUSAND/ÂΜL (ref 0–0.61)
EOSINOPHIL NFR BLD AUTO: 4 % (ref 0–6)
ERYTHROCYTE [DISTWIDTH] IN BLOOD BY AUTOMATED COUNT: 12.6 % (ref 11.6–15.1)
EST. AVERAGE GLUCOSE BLD GHB EST-MCNC: 103 MG/DL
GFR SERPL CREATININE-BSD FRML MDRD: 103 ML/MIN/1.73SQ M
GLUCOSE P FAST SERPL-MCNC: 83 MG/DL (ref 65–99)
HBA1C MFR BLD: 5.2 %
HCT VFR BLD AUTO: 39.1 % (ref 34.8–46.1)
HGB BLD-MCNC: 13.2 G/DL (ref 11.5–15.4)
IMM GRANULOCYTES # BLD AUTO: 0.01 THOUSAND/UL (ref 0–0.2)
IMM GRANULOCYTES NFR BLD AUTO: 0 % (ref 0–2)
LYMPHOCYTES # BLD AUTO: 1.57 THOUSANDS/ÂΜL (ref 0.6–4.47)
LYMPHOCYTES NFR BLD AUTO: 33 % (ref 14–44)
MCH RBC QN AUTO: 28.3 PG (ref 26.8–34.3)
MCHC RBC AUTO-ENTMCNC: 33.8 G/DL (ref 31.4–37.4)
MCV RBC AUTO: 84 FL (ref 82–98)
MONOCYTES # BLD AUTO: 0.33 THOUSAND/ÂΜL (ref 0.17–1.22)
MONOCYTES NFR BLD AUTO: 7 % (ref 4–12)
NEUTROPHILS # BLD AUTO: 2.63 THOUSANDS/ÂΜL (ref 1.85–7.62)
NEUTS SEG NFR BLD AUTO: 54 % (ref 43–75)
NRBC BLD AUTO-RTO: 0 /100 WBCS
PLATELET # BLD AUTO: 283 THOUSANDS/UL (ref 149–390)
PMV BLD AUTO: 9.9 FL (ref 8.9–12.7)
POTASSIUM SERPL-SCNC: 3.7 MMOL/L (ref 3.5–5.3)
PROT SERPL-MCNC: 6.8 G/DL (ref 6.4–8.4)
RBC # BLD AUTO: 4.66 MILLION/UL (ref 3.81–5.12)
RH BLD: POSITIVE
SODIUM SERPL-SCNC: 141 MMOL/L (ref 135–147)
SPECIMEN EXPIRATION DATE: NORMAL
WBC # BLD AUTO: 4.82 THOUSAND/UL (ref 4.31–10.16)

## 2023-05-25 ENCOUNTER — OFFICE VISIT (OUTPATIENT)
Dept: PHYSICAL THERAPY | Facility: CLINIC | Age: 53
End: 2023-05-25

## 2023-05-25 DIAGNOSIS — M54.2 NECK PAIN: ICD-10-CM

## 2023-05-25 DIAGNOSIS — Z98.890 S/P ABDOMINOPLASTY: Primary | ICD-10-CM

## 2023-05-25 LAB
ATRIAL RATE: 54 BPM
P AXIS: 44 DEGREES
PR INTERVAL: 140 MS
QRS AXIS: 23 DEGREES
QRSD INTERVAL: 80 MS
QT INTERVAL: 478 MS
QTC INTERVAL: 453 MS
T WAVE AXIS: 37 DEGREES
VENTRICULAR RATE: 54 BPM

## 2023-05-25 NOTE — PROGRESS NOTES
Daily Note      Today's date: 2023  Patient name: Cassidy Rodriguez  : 1970  MRN: [id number]  Referring provider: Fortunato Blackburn MD  Dx:   Encounter Diagnosis     ICD-10-CM    1  S/P abdominoplasty  Z98 890       2  Neck pain  M54 2           Subjective: Pt reports mild tightness in the abdomen  Pt states that she is feeling more tired than usual at the end of the school day today  Objective: See treatment diary below    Assessment: Pt tolerated treatment well  Pt presented with more tightness in the right upper/lower quarter compared to left, which improved slightly with manual therapy  Pt noted knee pain with squats and requested decreased weight for 2nd set  Pt progressed to step up with contralateral hip drive and overhead press and tolerated well  Plan: Continue per plan of care  Progress treatment as tolerated                 Insurance:  AMA/CMS Eval/ Re-eval POC expires Logan Pena #/ Referral # Total   Visits  Start date  Expiration date Extension  Visit limitation? PT only or  PT+OT?  Co-Insurance   BCBS   Auth submitted     BOMN PT No       [id number] 12  -                                                       AUTH #: Approved Date 4/12 4/17 4/19 4/24 4/26 5/1 5/3 5/8 5/11 5/16 5/22 5/25   Visits  Authed: 12 Used 1 1 1 1 1 1 1 1 1 1 1 1    Remaining  11 10 9 8 7 6 5 4 3 2 1 0        AUTH #: Approved Date               Visits  Authed: 12 Used                Remaining                     Precautions: standard  Medical History        Past Medical History:   Diagnosis Date   • Abnormal Pap smear of cervix     • Anxiety     • Binge eating disorder       on wellbutrin   • COVID-19 07/15/2022             Date 5/3/23 5/8/23 5/11/23 5/16/23 5/22/23 5/25/23   Visit Number 7 8 9 10 11 12             Manual         P-A mobs         Anterior innominate mobilization   3fascial layers of abdominal stacking b/l          STM Pin and stretch abdominals b/l Pin and stretch abdominals with "scar tissue massage pin and stretch abdominals with scar tissue massage pin and stretch abdominals with scar tissue massage pin and stretch abdominals with scar tissue massage Pin and stretch abdominals with scar tissue massage             Neuro Re-ed         TrA progression 3sx10        TA+ march         TA + SLR           Balloon breath training Standing lat pulldown with back extension BTB 25x Standing lat pulldown with back extension BTB 25x Standing lat pulldown with back extension BTB 25x Standing lat pulldown with back extension BTB 25x    Hip add squeeze  Various step exercises with arm raises to open chest 7' TA engagement 10x+   March + TA  TA engagement 10x+   March + TA  TA engagement 10x+   March + TA  TA march 25x    SLR \"rainbow\"         SOC 2x10 2*10       Bridge  2x10 with add squeeze 2*10 w ADD squeeze 2*10 with ADD squeeze 2*10 with ADD squeeze 2*10 w/ ADD squeeze 2x10 w/ add squeeze   Hooklying clamshells  2x10 GTB  step ups with various foot work 5 min with arm openers  step ups with various foot work 5 min with arm openers Step ups with contralateral hip drive and overhead press 2x10 B/L 4# DB on 8\" step   SEVERO         Hip ABD/ ADD    SL clamshells 2x10 B  SL clamshells 2x10 B  SL clamshells 2x10 B/L    Left hip flexion/ knee to chest   Foot on step behind, back ext with arm stretch   Foot on step behind, back ext with arm stretch 2x10 Foot on step behind, back ext with arm stretch 2x10    Prone press up         Thera Ex         CHARLY/REIL         TB shoulder ext 2*10 9#        TB shoulder row 2*10 9#         Paloff press          Anti-rotation press          band \"pull the sword\"         Lateral walk          Sit to stand         Cat cow          cat to glutes on heels    TRX supermans x10  TRX supermans x10  TRX supermans 2x10    Reciprocal gait with canes  CC chops x10 B 7 5#   BTB chops 2x10 B BTB chops 2x10 Chops w/ 7 5# on CC 20 B/L    KB swings 2x10 9#    KB squats with 18# KB 2x10  KB " squats w/ 18# KN 2x10 KB squats   1x10 18#  1x10 9#   Step hip flexor stretch  10x10s B         LTR                  Pointer          wisdom pose with walkout         Thera Activity         Patient education         Lifting mechanics         Posture education                                                           Modalities                                                                         Insurance:  AMA/CMS Eval/ Re-eval POC expires Viola Mar #/ Referral # Total   Visits  Start date  Expiration date Extension  Visit limitation? PT only or  PT+OT?  Co-Insurance   BCBS 4/12 7/5  Auth submitted  4/12   BOMN PT No                                                                 AUTH #: Approved Date 4/12 4/17 4/19 4/24 4/26 5/1 5/3 5/8 5/11 5/16 5/18    Visits  Authed: 12 Used 1 1 1 1 1 1 1 1 1 1 1     Remaining  11 10 9 8 7 6 5 4 3 2 1            Precautions: standard  Medical History        Past Medical History:   Diagnosis Date   • Abnormal Pap smear of cervix     • Anxiety     • Binge eating disorder       on wellbutrin   • COVID-19 07/15/2022             Date 4/24/23 4/26/23 5/1/23 5/3/23 5/8/23 5/11/23 5/16/23 5/18   Visit Number 4 5 6 7 8 9 10                Manual           P-A mobs           Anterior innominate mobilization Abdominal stretching; soft cupping  3 fascial layers of abdominal stacking b/l   3fascial layers of abdominal stacking b/l         STM Scar massage to incisions  Pin and stretch abdominals b/l Pin and stretch abdominals b/l Pin and stretch abdominals with scar tissue massage pin and stretch abdominals with scar tissue massage pin and stretch abdominals with scar tissue massage pin and stretch abdominals with scar tissue massage               Neuro Re-ed           TrA progression  3sx10  3sx10       TA+ march           TA + SLR            TRX staggered legs abdominal stretch 3*20 sec  360 breathing with SOS  Balloon breath training Standing lat pulldown with back extension BTB 25x "Standing lat pulldown with back extension BTB 25x Standing lat pulldown with back ext 12 5# 25x   Hip add squeeze     Various step exercises with arm raises to open chest 7' TA engagement 10x+   March + TA  TA engagement 10x+   March + TA     SLR \"rainbow\"           SOC  x10  2*10x  2x10 2*10      Bridge  20x + ADD squeeze 2x10 with add squeeze 2x10 with add squeeze 2x10 with add squeeze 2*10 w ADD squeeze 2*10 with ADD squeeze 2*10 with ADD squeeze    Hooklying clamshells     2x10 GTB  step ups with various foot work 5 min with arm openers  step ups with various foot work 5 min with arm openers   SEVERO           Hip ABD/ ADD       SL clamshells 2x10 B     Left hip flexion/ knee to chest      Foot on step behind, back ext with arm stretch      Prone press up           Thera Ex           CHARLY/REIL 2*10 x10         TB shoulder ext 2*10 12 5# 2x10 9#  2*10 9# 2*10 9#       TB shoulder row 2*10 12 5# 2x10 9# 2*10 9#  2*10 9#        Paloff press            Anti-rotation press Step ups with various foot work 5 min  Step ups with various foot work 5 min         band \"pull the sword\"           Lateral walk            Sit to stand           Cat cow 20x 20x          cat to glutes on heels       TRX supermans x10  TRX supermans x 10x2    Reciprocal gait with canes     CC chops x10 B 7 5#   BTB chops 2x10 B BTB chops 2*10   KB swings    2x10 9#    KB squats with 18# KB 2x10  KB squats w 18# 2*10   Step hip flexor stretch     10x10s B        LTR  2x10            NS x8' L1  rec bike 6 min lvl 3        Pointer            wisdom pose with walkout           Thera Activity           Patient education           Lifting mechanics           Posture education                                                                       Modalities                                                                          "

## 2023-05-31 ENCOUNTER — OFFICE VISIT (OUTPATIENT)
Dept: FAMILY MEDICINE CLINIC | Facility: CLINIC | Age: 53
End: 2023-05-31

## 2023-05-31 VITALS
HEIGHT: 63 IN | SYSTOLIC BLOOD PRESSURE: 118 MMHG | HEART RATE: 56 BPM | RESPIRATION RATE: 16 BRPM | TEMPERATURE: 98.3 F | WEIGHT: 134.38 LBS | BODY MASS INDEX: 23.81 KG/M2 | DIASTOLIC BLOOD PRESSURE: 78 MMHG

## 2023-05-31 DIAGNOSIS — M79.671 RIGHT FOOT PAIN: ICD-10-CM

## 2023-05-31 DIAGNOSIS — F41.9 ANXIETY: ICD-10-CM

## 2023-05-31 DIAGNOSIS — K13.0 DRY LIPS: Primary | ICD-10-CM

## 2023-05-31 DIAGNOSIS — N95.0 POSTMENOPAUSAL BLEEDING: ICD-10-CM

## 2023-05-31 RX ORDER — LORAZEPAM 1 MG/1
1 TABLET ORAL DAILY PRN
Qty: 30 TABLET | Refills: 0 | Status: SHIPPED | OUTPATIENT
Start: 2023-05-31

## 2023-05-31 NOTE — ASSESSMENT & PLAN NOTE
Reviewed gyn visit notes  Brigida Hartman has decided to cancel hysterectomy that was scheduled and repeat endometrial biopsy in the future

## 2023-05-31 NOTE — PROGRESS NOTES
Name: Kana Gallardo      : 1970      MRN: [id number]  Encounter Provider: Jennette Hashimoto, MD  Encounter Date: 2023   Encounter department: [address] [place] [address]     1  Dry lips  -     Ambulatory Referral to Dermatology; Future    2  Anxiety  -     LORazepam (ATIVAN) 1 mg tablet; Take 1 tablet (1 mg total) by mouth daily as needed for anxiety    3  Right foot pain  -     Ambulatory referral to Podiatry; Future    4  Postmenopausal bleeding  Assessment & Plan:  Reviewed gyn visit notes  Era Book has decided to cancel hysterectomy that was scheduled and repeat endometrial biopsy in the future  Subjective      HPI   She is here today to discuss multiple issues  Was initially supposed to have a hysterectomy for postmenopausal bleeding after failed endometrial biopsy attempts, however this was cancelled  Pt has decided to reattempt endometrial biopsy in the future  No current bleeding  She has had dry lips for years and has tried multiple over the counter remedies as well as increasing hydration with no improvement  Has history of stress/anxiety which usually presents as pressure in her face  Ativan as needed helps with this  She reports a callus on her right foot which has been causing discomfort  Would like to have it removed  Review of Systems   Constitutional: Negative  HENT: Negative  Eyes: Negative  Respiratory: Negative  Cardiovascular: Negative  Gastrointestinal: Negative  Endocrine: Negative  Genitourinary: Negative  Musculoskeletal: Negative  Skin: Negative  As noted in HPI   Allergic/Immunologic: Negative  Neurological: Negative  Hematological: Negative  Psychiatric/Behavioral: Negative          Current Outpatient Medications on File Prior to Visit   Medication Sig   • B Complex Vitamins (VITAMIN B COMPLEX PO) Take by mouth every other day     • buPROPion (WELLBUTRIN XL) 300 mg 24 hr tablet TAKE 1 TABLET BY "MOUTH EVERY DAY   • clobetasol (TEMOVATE) 0 05 % cream Apply 5 g (1 application total) topically 2 (two) times a day for 14 days, THEN 5 g (1 application total) 3 (three) times a week  • Collagen-Boron-Hyaluronic Acid (Move Free Ultra Joint Health) 40-5-3 3 MG TABS Take by mouth   • DOCUSATE SODIUM PO Take 1 each by mouth   • multivitamin (THERAGRAN) TABS Take 1 tablet by mouth daily   • vitamin A 2400 MCG (8000 UT) capsule Take 8,000 Units by mouth daily   • [DISCONTINUED] LORazepam (ATIVAN) 1 mg tablet Take 1 tablet (1 mg total) by mouth daily as needed for anxiety       Objective     /78   Pulse 56   Temp 98 3 °F (36 8 °C) (Tympanic)   Resp 16   Ht 5' 3\" (1 6 m)   Wt 61 kg (134 lb 6 oz)   LMP 11/01/2022 (Approximate)   BMI 23 80 kg/m²     Physical Exam  Constitutional:       General: She is not in acute distress  Appearance: She is well-developed  She is not diaphoretic  HENT:      Head: Normocephalic and atraumatic  Mouth/Throat:      Comments: Dry lips  Cardiovascular:      Rate and Rhythm: Normal rate and regular rhythm  Heart sounds: Normal heart sounds  No murmur heard  No friction rub  No gallop  Pulmonary:      Effort: Pulmonary effort is normal  No respiratory distress  Breath sounds: Normal breath sounds  No wheezing or rales  Chest:      Chest wall: No tenderness  Musculoskeletal:         General: No deformity  Normal range of motion  Cervical back: Normal range of motion and neck supple  Skin:     General: Skin is warm and dry  Neurological:      Mental Status: She is alert and oriented to person, place, and time  Psychiatric:         Behavior: Behavior normal          Thought Content:  Thought content normal          Judgment: Judgment normal        Shruti Coffey MD  "

## 2023-06-01 ENCOUNTER — OFFICE VISIT (OUTPATIENT)
Dept: PHYSICAL THERAPY | Facility: CLINIC | Age: 53
End: 2023-06-01

## 2023-06-01 DIAGNOSIS — Z98.890 S/P ABDOMINOPLASTY: Primary | ICD-10-CM

## 2023-06-01 NOTE — PROGRESS NOTES
Daily Note     Today's date: 2023  Patient name: Belen Whitlock  : 1970  MRN: [id number]  Referring provider: Jasper Sterling MD  Dx:   Encounter Diagnosis     ICD-10-CM    1  S/P abdominoplasty  Z98 890           Start Time: 1500  Stop Time: 8152  Total time in clinic (min): 45 minutes    Subjective: Patient reports feeling better      Objective: See treatment diary below      Assessment: Tolerated treatment well  Patient would benefit from continued PT in order to regain strength and ROM  Patient feels that she is making improvement  Continue to work on abdominal opening      Plan: Continue per plan of care             Insurance:  AMA/CMS Eval/ Re-eval POC expires [place] #/ Referral # Total   Visits  Start date  Expiration date Extension  Visit limitation? PT only or  PT+OT?  Co-Insurance   BCBS   Auth submitted     BOMN PT No       [id number] 12  -                                                       AUTH #: Approved Date 4/12 4/17 4/19 4/24 4/26 5/1 5/3 5/8 5/11 5/16 5/22 5/25   Visits  Authed: 12 Used 1 1 1 1 1 1 1 1 1 1 1 1    Remaining  11 10 9 8 7 6 5 4 3 2 1 0        AUTH #: Approved Date               Visits  Authed: 12 Used                Remaining                     Precautions: standard  Medical History        Past Medical History:   Diagnosis Date   • Abnormal Pap smear of cervix     • Anxiety     • Binge eating disorder       on wellbutrin   • COVID-19 07/15/2022             Date 23   Visit Number 8 9 10 11 12 13             Manual         P-A mobs         Anterior innominate mobilization  3fascial layers of abdominal stacking b/l           STM Pin and stretch abdominals with scar tissue massage pin and stretch abdominals with scar tissue massage pin and stretch abdominals with scar tissue massage pin and stretch abdominals with scar tissue massage Pin and stretch abdominals with scar tissue massage Pin and stretch abdominals "with scar tissue massage             Neuro Re-ed         TrA progression         TA+ march         TA + SLR          Balloon breath training Standing lat pulldown with back extension BTB 25x Standing lat pulldown with back extension BTB 25x Standing lat pulldown with back extension BTB 25x Standing lat pulldown with back extension BTB 25x  Standing lat pulldown with back extension BTB 25x   Hip add squeeze Various step exercises with arm raises to open chest 7' TA engagement 10x+   March + TA  TA engagement 10x+   March + TA  TA engagement 10x+   March + TA  TA march 25x  TA single leg extension   SLR \"rainbow\"      Bridge w ADD squeeze 20x   SOC 2*10        Bridge  2*10 w ADD squeeze 2*10 with ADD squeeze 2*10 with ADD squeeze 2*10 w/ ADD squeeze 2x10 w/ add squeeze 2*10 w/ ADD squeeze   Hooklying clamshells   step ups with various foot work 5 min with arm openers  step ups with various foot work 5 min with arm openers Step ups with contralateral hip drive and overhead press 2x10 B/L 4# DB on 8\" step step ups with various foot work 5 min with arm openers   SEVERO         Hip ABD/ ADD   SL clamshells 2x10 B  SL clamshells 2x10 B  SL clamshells 2x10 B/L  SL clamshells 2x10 B/L    Left hip flexion/ knee to chest  Foot on step behind, back ext with arm stretch   Foot on step behind, back ext with arm stretch 2x10 Foot on step behind, back ext with arm stretch 2x10  Foot on step behind, back ext with arm stretch 2x10   Prone press up         Thera Ex         CHARLY/REIL         TB shoulder ext         TB shoulder row         Paloff press          Anti-rotation press          band \"pull the sword\"         Lateral walk          Sit to stand         Cat cow          cat to glutes on heels   TRX supermans x10  TRX supermans x10  TRX supermans 2x10  TRX supermans 2x10    Reciprocal gait with canes    BTB chops 2x10 B BTB chops 2x10 Chops w/ 7 5# on CC 20 B/L     KB swings   KB squats with 18# KB 2x10  KB squats w/ 18# KN 2x10 KB " squats   1x10 18#  1x10 9#    Step hip flexor stretch          LTR                  Pointer          wisdom pose with walkout         Thera Activity         Patient education         Lifting mechanics         Posture education                                                           Modalities                                                                         Insurance:  AMA/CMS Eval/ Re-eval POC expires [place] #/ Referral # Total   Visits  Start date  Expiration date Extension  Visit limitation? PT only or  PT+OT?  Co-Insurance   BCBS 4/12 7/5  Auth submitted  4/12   BOMN PT No                                                                 AUTH #: Approved Date 4/12 4/17 4/19 4/24 4/26 5/1 5/3 5/8 5/11 5/16 5/18    Visits  Authed: 12 Used 1 1 1 1 1 1 1 1 1 1 1     Remaining  11 10 9 8 7 6 5 4 3 2 1            Precautions: standard  Medical History        Past Medical History:   Diagnosis Date   • Abnormal Pap smear of cervix     • Anxiety     • Binge eating disorder       on wellbutrin   • COVID-19 07/15/2022             Date 4/24/23 4/26/23 5/1/23 5/3/23 5/8/23 5/11/23 5/16/23 5/18   Visit Number 4 5 6 7 8 9 10                Manual           P-A mobs           Anterior innominate mobilization Abdominal stretching; soft cupping  3 fascial layers of abdominal stacking b/l   3fascial layers of abdominal stacking b/l         STM Scar massage to incisions  Pin and stretch abdominals b/l Pin and stretch abdominals b/l Pin and stretch abdominals with scar tissue massage pin and stretch abdominals with scar tissue massage pin and stretch abdominals with scar tissue massage pin and stretch abdominals with scar tissue massage               Neuro Re-ed           TrA progression  3sx10  3sx10       TA+ march           TA + SLR            TRX staggered legs abdominal stretch 3*20 sec  360 breathing with SOS  Balloon breath training Standing lat pulldown with back extension BTB 25x Standing lat pulldown with back "extension BTB 25x Standing lat pulldown with back ext 12 5# 25x   Hip add squeeze     Various step exercises with arm raises to open chest 7' TA engagement 10x+   March + TA  TA engagement 10x+   March + TA     SLR \"rainbow\"           SOC  x10  2*10x  2x10 2*10      Bridge  20x + ADD squeeze 2x10 with add squeeze 2x10 with add squeeze 2x10 with add squeeze 2*10 w ADD squeeze 2*10 with ADD squeeze 2*10 with ADD squeeze    Hooklying clamshells     2x10 GTB  step ups with various foot work 5 min with arm openers  step ups with various foot work 5 min with arm openers   SEVERO           Hip ABD/ ADD       SL clamshells 2x10 B     Left hip flexion/ knee to chest      Foot on step behind, back ext with arm stretch      Prone press up           Thera Ex           CHARLY/REIL 2*10 x10         TB shoulder ext 2*10 12 5# 2x10 9#  2*10 9# 2*10 9#       TB shoulder row 2*10 12 5# 2x10 9# 2*10 9#  2*10 9#        Paloff press            Anti-rotation press Step ups with various foot work 5 min  Step ups with various foot work 5 min         band \"pull the sword\"           Lateral walk            Sit to stand           Cat cow 20x 20x          cat to glutes on heels       TRX supermans x10  TRX supermans x 10x2    Reciprocal gait with canes     CC chops x10 B 7 5#   BTB chops 2x10 B BTB chops 2*10   KB swings    2x10 9#    KB squats with 18# KB 2x10  KB squats w 18# 2*10   Step hip flexor stretch     10x10s B        LTR  2x10            NS x8' L1  rec bike 6 min lvl 3        Pointer            wisdom pose with walkout           Thera Activity           Patient education           Lifting mechanics           Posture education                                                                       Modalities                                                                           "

## 2023-06-05 ENCOUNTER — OFFICE VISIT (OUTPATIENT)
Dept: PHYSICAL THERAPY | Facility: CLINIC | Age: 53
End: 2023-06-05
Payer: COMMERCIAL

## 2023-06-05 DIAGNOSIS — Z98.890 S/P ABDOMINOPLASTY: Primary | ICD-10-CM

## 2023-06-05 DIAGNOSIS — M54.2 NECK PAIN: ICD-10-CM

## 2023-06-05 PROCEDURE — 97112 NEUROMUSCULAR REEDUCATION: CPT

## 2023-06-05 PROCEDURE — 97110 THERAPEUTIC EXERCISES: CPT

## 2023-06-05 PROCEDURE — 97140 MANUAL THERAPY 1/> REGIONS: CPT

## 2023-06-05 NOTE — PROGRESS NOTES
Daily Note     Today's date: 2023  Patient name: Bita Dasilva  : 1970  MRN: [id number]  Referring provider: Kimberly Fisher MD  Dx:   Encounter Diagnosis     ICD-10-CM    1  S/P abdominoplasty  Z98 890       2  Neck pain  M54 2                      Subjective: Pt reports she doesn't notice issues as much and if she does then it isnt as bad as it has been in the past        Objective: See treatment diary below      Assessment: Pt reports feeling really good at end of tx session, reports no pain during or post tx  Plan: Continue per plan of care             Insurance:  AMA/CMS Eval/ Re-eval POC expires A[place] #/ Referral # Total   Visits  Start date  Expiration date Extension  Visit limitation? PT only or  PT+OT?  Co-Insurance   BCBS   Auth submitted     BOMN PT No       [id number] 12  -                                                       AUTH #: Approved Date 4/12 4/17 4/19 4/24 4/26 5/1 5/3 5/8 5/11 5/16 5/22 5/25   Visits  Authed: 12 Used 1 1 1 1 1 1 1 1 1 1 1 1    Remaining  11 10 9 8 7 6 5 4 3 2 1 0        AUTH #: Approved Date              Visits  Authed: 12 Used 1 1              Remaining  11 10                  Precautions: standard  Medical History        Past Medical History:   Diagnosis Date   • Abnormal Pap smear of cervix     • Anxiety     • Binge eating disorder       on wellbutrin   • COVID-19 07/15/2022             Date 23   Visit Number 10 11 12 13 14            Manual        P-A mobs        Anterior innominate mobilization        STM pin and stretch abdominals with scar tissue massage pin and stretch abdominals with scar tissue massage Pin and stretch abdominals with scar tissue massage Pin and stretch abdominals with scar tissue massage Pin and stretch abdominals with scar tissue massage            Neuro Re-ed        TrA progression        TA+ march        TA + SLR         Standing lat pulldown with back extension "BTB 25x Standing lat pulldown with back extension BTB 25x Standing lat pulldown with back extension BTB 25x  Standing lat pulldown with back extension BTB 25x    Hip add squeeze TA engagement 10x+   March + TA  TA engagement 10x+   March + TA  TA march 25x  TA single leg extension TA single leg extension   SLR \"rainbow\"    Bridge w ADD squeeze 20x Anyi Doherty w/ ADD squeeze 20x   SOC        Bridge  2*10 with ADD squeeze 2*10 w/ ADD squeeze 2x10 w/ add squeeze 2*10 w/ ADD squeeze 2*10 w/ ADD squeeze   Hooklying clamshells   step ups with various foot work 5 min with arm openers Step ups with contralateral hip drive and overhead press 2x10 B/L 4# DB on 8\" step step ups with various foot work 5 min with arm openers    SEVERO        Hip ABD/ ADD SL clamshells 2x10 B  SL clamshells 2x10 B  SL clamshells 2x10 B/L  SL clamshells 2x10 B/L  SL clamshells 2x10 B/L    Left hip flexion/ knee to chest  Foot on step behind, back ext with arm stretch 2x10 Foot on step behind, back ext with arm stretch 2x10  Foot on step behind, back ext with arm stretch 2x10 Foot on step behind, back ext with arm stretch 2x10   Prone press up        Thera Ex        CHARLY/REIL        TB shoulder ext        TB shoulder row        Paloff press         Anti-rotation press         band \"pull the sword\"        Lateral walk         Sit to stand        Cat cow         cat to glutes on heels TRX supermans x10  TRX supermans x10  TRX supermans 2x10  TRX supermans 2x10  TRX supermans 2x10   Reciprocal gait with canes  BTB chops 2x10 B BTB chops 2x10 Chops w/ 7 5# on CC 20 B/L   Chops w/ 7 5# on CC 20 B/L   KB swings KB squats with 18# KB 2x10  KB squats w/ 18# KN 2x10 KB squats   1x10 18#  1x10 9#     Step hip flexor stretch         LTR                Pointer         wisdom pose with walkout        Thera Activity        Patient education        Lifting mechanics        Posture education                                                     Modalities                   " "                                                    Insurance:  AMA/CMS Eval/ Re-eval POC expires [place] #/ Referral # Total   Visits  Start date  Expiration date Extension  Visit limitation? PT only or  PT+OT?  Co-Insurance   BCBS 4/12 7/5  Auth submitted  4/12   BOMN PT No                                                                 AUTH #: Approved Date 4/12 4/17 4/19 4/24 4/26 5/1 5/3 5/8 5/11 5/16 5/18    Visits  Authed: 12 Used 1 1 1 1 1 1 1 1 1 1 1     Remaining  11 10 9 8 7 6 5 4 3 2 1            Precautions: standard  Medical History        Past Medical History:   Diagnosis Date   • Abnormal Pap smear of cervix     • Anxiety     • Binge eating disorder       on wellbutrin   • COVID-19 07/15/2022             Date 4/24/23 4/26/23 5/1/23 5/3/23 5/8/23 5/11/23 5/16/23 5/18   Visit Number 4 5 6 7 8 9 10                Manual           P-A mobs           Anterior innominate mobilization Abdominal stretching; soft cupping  3 fascial layers of abdominal stacking b/l   3fascial layers of abdominal stacking b/l         STM Scar massage to incisions  Pin and stretch abdominals b/l Pin and stretch abdominals b/l Pin and stretch abdominals with scar tissue massage pin and stretch abdominals with scar tissue massage pin and stretch abdominals with scar tissue massage pin and stretch abdominals with scar tissue massage               Neuro Re-ed           TrA progression  3sx10  3sx10       TA+ march           TA + SLR            TRX staggered legs abdominal stretch 3*20 sec  360 breathing with SOS  Balloon breath training Standing lat pulldown with back extension BTB 25x Standing lat pulldown with back extension BTB 25x Standing lat pulldown with back ext 12 5# 25x   Hip add squeeze     Various step exercises with arm raises to open chest 7' TA engagement 10x+   March + TA  TA engagement 10x+   March + TA     SLR \"rainbow\"           SOC  x10  2*10x  2x10 2*10      Bridge  20x + ADD squeeze 2x10 with add squeeze 2x10 " "with add squeeze 2x10 with add squeeze 2*10 w ADD squeeze 2*10 with ADD squeeze 2*10 with ADD squeeze    Hooklying clamshells     2x10 GTB  step ups with various foot work 5 min with arm openers  step ups with various foot work 5 min with arm openers   SEVERO           Hip ABD/ ADD       SL clamshells 2x10 B     Left hip flexion/ knee to chest      Foot on step behind, back ext with arm stretch      Prone press up           Thera Ex           CHARLY/REIL 2*10 x10         TB shoulder ext 2*10 12 5# 2x10 9#  2*10 9# 2*10 9#       TB shoulder row 2*10 12 5# 2x10 9# 2*10 9#  2*10 9#        Paloff press            Anti-rotation press Step ups with various foot work 5 min  Step ups with various foot work 5 min         band \"pull the sword\"           Lateral walk            Sit to stand           Cat cow 20x 20x          cat to glutes on heels       TRX supermans x10  TRX supermans x 10x2    Reciprocal gait with canes     CC chops x10 B 7 5#   BTB chops 2x10 B BTB chops 2*10   KB swings    2x10 9#    KB squats with 18# KB 2x10  KB squats w 18# 2*10   Step hip flexor stretch     10x10s B        LTR  2x10            NS x8' L1  rec bike 6 min lvl 3        Pointer            wisdom pose with walkout           Thera Activity           Patient education           Lifting mechanics           Posture education                                                                       Modalities                                                                             "

## 2023-06-07 DIAGNOSIS — N88.2 CERVICAL STENOSIS (UTERINE CERVIX): Primary | ICD-10-CM

## 2023-06-08 ENCOUNTER — OFFICE VISIT (OUTPATIENT)
Dept: PHYSICAL THERAPY | Facility: CLINIC | Age: 53
End: 2023-06-08
Payer: COMMERCIAL

## 2023-06-08 DIAGNOSIS — M54.2 NECK PAIN: ICD-10-CM

## 2023-06-08 DIAGNOSIS — Z98.890 S/P ABDOMINOPLASTY: Primary | ICD-10-CM

## 2023-06-08 PROCEDURE — 97110 THERAPEUTIC EXERCISES: CPT

## 2023-06-08 PROCEDURE — 97112 NEUROMUSCULAR REEDUCATION: CPT

## 2023-06-08 NOTE — PROGRESS NOTES
Daily Note     Today's date: 2023  Patient name: Ethel Wei  : 1970  MRN: [id number]  Referring provider: Francisco Gunter MD  Dx:   Encounter Diagnosis     ICD-10-CM    1  S/P abdominoplasty  Z98 890       2  Neck pain  M54 2           Start Time: 1630  Stop Time: 1710  Total time in clinic (min): 40 minutes    Subjective: Patient has no new complaints  Objective: See treatment diary below      Assessment: Patient reports difficulties with performing 90/90 TA leg extension due to increased pain near incision  Will continue to progress core as tolerated  Plan: Continue per plan of care             Insurance:  AMA/CMS Eval/ Re-eval POC expires Amye Cream[place] #/ Referral # Total   Visits  Start date  Expiration date Extension  Visit limitation? PT only or  PT+OT?  Co-Insurance   BCBS   Auth submitted     BOMN PT No       [id number] 12  -                                                       AUTH #: Approved Date 4/12 4/17 4/19 4/24 4/26 5/1 5/3 5/8 5/11 5/16 5/22 5/25   Visits  Authed: 12 Used 1 1 1 1 1 1 1 1 1 1 1 1    Remaining  11 10 9 8 7 6 5 4 3 2 1 0        AUTH #: Approved Date             Visits  Authed: 12 Used 1 1 1             Remaining  11 10 9                 Precautions: standard  Medical History        Past Medical History:   Diagnosis Date   • Abnormal Pap smear of cervix     • Anxiety     • Binge eating disorder       on wellbutrin   • COVID-19 07/15/2022             Date 23   Visit Number 10 11 12 13 14 15             Manual         P-A mobs         Anterior innominate mobilization         STM pin and stretch abdominals with scar tissue massage pin and stretch abdominals with scar tissue massage Pin and stretch abdominals with scar tissue massage Pin and stretch abdominals with scar tissue massage Pin and stretch abdominals with scar tissue massage              Neuro Re-ed         TrA progression         TA+ "march         TA + SLR      Bicycle 3x30s     Standing lat pulldown with back extension BTB 25x Standing lat pulldown with back extension BTB 25x Standing lat pulldown with back extension BTB 25x  Standing lat pulldown with back extension BTB 25x     Hip add squeeze TA engagement 10x+   March + TA  TA engagement 10x+   March + TA  TA march 25x  TA single leg extension TA single leg extension    SLR \"rainbow\"    Bridge w ADD squeeze 20x Daniel Pauline w/ ADD squeeze 20x Bridge with leg ext 2x10    SOC         Bridge  2*10 with ADD squeeze 2*10 w/ ADD squeeze 2x10 w/ add squeeze 2*10 w/ ADD squeeze 2*10 w/ ADD squeeze    Hooklying clamshells   step ups with various foot work 5 min with arm openers Step ups with contralateral hip drive and overhead press 2x10 B/L 4# DB on 8\" step step ups with various foot work 5 min with arm openers     SEVERO         Hip ABD/ ADD SL clamshells 2x10 B  SL clamshells 2x10 B  SL clamshells 2x10 B/L  SL clamshells 2x10 B/L  SL clamshells 2x10 B/L  SL clamshells 2x10 B/L  GTB   Left hip flexion/ knee to chest  Foot on step behind, back ext with arm stretch 2x10 Foot on step behind, back ext with arm stretch 2x10  Foot on step behind, back ext with arm stretch 2x10 Foot on step behind, back ext with arm stretch 2x10 Foot on step behind, back ext with arm stretch 2x10   Prone press up         Thera Ex         CHARLY/REIL         TB shoulder ext         TB shoulder row         Paloff press       ABC 2 sets   Anti-rotation press          band \"pull the sword\"         Lateral walk          Sit to stand         Cat cow          cat to glutes on heels TRX supermans x10  TRX supermans x10  TRX supermans 2x10  TRX supermans 2x10  TRX supermans 2x10 TRX supermans 2x10   Reciprocal gait with canes  BTB chops 2x10 B BTB chops 2x10 Chops w/ 7 5# on CC 20 B/L   Chops w/ 7 5# on CC 20 B/L Chops w/ 7 5# on CC 20 B/L   KB swings KB squats with 18# KB 2x10  KB squats w/ 18# KN 2x10 KB squats   1x10 18#  1x10 9#    " Step hip flexor stretch          LTR                  Pointer          wisdom pose with walkout         Thera Activity         Patient education         Lifting mechanics         Posture education                                                           Modalities

## 2023-06-09 RX ORDER — MISOPROSTOL 200 UG/1
200 TABLET ORAL ONCE
Qty: 1 TABLET | Refills: 0 | Status: SHIPPED | OUTPATIENT
Start: 2023-06-09 | End: 2023-06-09

## 2023-06-12 ENCOUNTER — OFFICE VISIT (OUTPATIENT)
Dept: PHYSICAL THERAPY | Facility: CLINIC | Age: 53
End: 2023-06-12
Payer: COMMERCIAL

## 2023-06-12 DIAGNOSIS — M54.2 NECK PAIN: ICD-10-CM

## 2023-06-12 DIAGNOSIS — Z98.890 S/P ABDOMINOPLASTY: Primary | ICD-10-CM

## 2023-06-12 PROCEDURE — 97110 THERAPEUTIC EXERCISES: CPT

## 2023-06-12 NOTE — PROGRESS NOTES
Daily Note     Today's date: 2023  Patient name: Pete Gastelum  : 1970  MRN: [id number]  Referring provider: Aleksander Isbell MD  Dx:   Encounter Diagnosis     ICD-10-CM    1  S/P abdominoplasty  Z98 890       2  Neck pain  M54 2                      Subjective:       Objective: See treatment diary below      Assessment: Tolerated treatment well  Patient exhibited good technique with therapeutic exercises      Plan: Continue per plan of care  1:1 30 min      Insurance:  AMA/CMS Eval/ Re-eval POC expires [place] #/ Referral # Total   Visits  Start date  Expiration date Extension  Visit limitation? PT only or  PT+OT?  Co-Insurance   BCBS   Auth submitted     BOMN PT No       [id number] 12  -                                                       AUTH #: Approved Date 4/12 4/17 4/19 4/24 4/26 5/1 5/3 5/8 5/11 5/16 5/22 5/25   Visits  Authed: 12 Used 1 1 1 1 1 1 1 1 1 1 1 1    Remaining  11 10 9 8 7 6 5 4 3 2 1 0        AUTH #: Approved Date            Visits  Authed: 12 Used 1 1 1 1            Remaining  11 10 9 8                Precautions: standard  Medical History        Past Medical History:   Diagnosis Date   • Abnormal Pap smear of cervix     • Anxiety     • Binge eating disorder       on wellbutrin   • COVID-19 07/15/2022             Date 23   Visit Number 12 13 14 15 16            Manual        P-A mobs        Anterior innominate mobilization        STM Pin and stretch abdominals with scar tissue massage Pin and stretch abdominals with scar tissue massage Pin and stretch abdominals with scar tissue massage  Pin and stretch abdominals with scar tissue massage            Neuro Re-ed        TrA progression        + march        TA + SLR    Bicycle 3x30s  Bicycle 3x30s    Standing lat pulldown with back extension BTB 25x  Standing lat pulldown with back extension BTB 25x      Hip add squeeze  25x  TA single leg extension "TA single leg extension     SLR \"rainbow\"  Bridge w ADD squeeze 20x Anice Pandora w/ ADD squeeze 20x Bridge with leg ext 2x10  Bridge w/ leg ext 2x10   SOC        Bridge  2x10 w/ add squeeze 2*10 w/ ADD squeeze 2*10 w/ ADD squeeze     Hooklying clamshells  Step ups with contralateral hip drive and overhead press 2x10 B/L 4# DB on 8\" step step ups with various foot work 5 min with arm openers      SEVERO        Hip ABD/ ADD SL clamshells 2x10 B/L  SL clamshells 2x10 B/L  SL clamshells 2x10 B/L  SL clamshells 2x10 B/L  GTB SL clamshells 2x10 B/L GTB   Left hip flexion/ knee to chest Foot on step behind, back ext with arm stretch 2x10  Foot on step behind, back ext with arm stretch 2x10 Foot on step behind, back ext with arm stretch 2x10 Foot on step behind, back ext with arm stretch 2x10 Foot on step behind, back ext with arm stretch 2x10   Prone press up        Thera Ex        CHARLY/REIL        TB shoulder ext        TB shoulder row        Paloff press     ABC 2 sets ABC 2 sets   Anti-rotation press         band \"pull the sword\"        Lateral walk         Sit to stand        Cat cow         cat to glutes on heels TRX supermans 2x10  TRX supermans 2x10  TRX supermans 2x10 TRX supermans 2x10 TRX supermans 2x10   Reciprocal gait with canes  Chops w/ 7 5# on CC 20 B/L   Chops w/ 7 5# on CC 20 B/L Chops w/ 7 5# on CC 20 B/L Chops w/ 7 5# on CC 20 B/L   KB swings KB squats   1x10 18#  1x10 9#       Step hip flexor stretch         LTR                Pointer         wisdom pose with walkout        Thera Activity        Patient education        Lifting mechanics        Posture education                                                     Modalities                                                                                                                 "

## 2023-06-14 ENCOUNTER — OFFICE VISIT (OUTPATIENT)
Dept: PHYSICAL THERAPY | Facility: CLINIC | Age: 53
End: 2023-06-14
Payer: COMMERCIAL

## 2023-06-14 DIAGNOSIS — M54.2 NECK PAIN: ICD-10-CM

## 2023-06-14 DIAGNOSIS — Z98.890 S/P ABDOMINOPLASTY: Primary | ICD-10-CM

## 2023-06-14 PROCEDURE — 97112 NEUROMUSCULAR REEDUCATION: CPT

## 2023-06-14 PROCEDURE — 97110 THERAPEUTIC EXERCISES: CPT

## 2023-06-14 NOTE — PROGRESS NOTES
Daily Note     Today's date: 2023  Patient name: Ethel Wei  : 1970  MRN: [id number]  Referring provider: Francisco Gunter MD  Dx:   Encounter Diagnosis     ICD-10-CM    1  S/P abdominoplasty  Z98 890       2  Neck pain  M54 2                      Subjective: I am feeling really good  Thinking about doing an exercise class      Objective: See treatment diary below      Assessment: Tolerated treatment well  Patient would benefit from continued PT in order to build core and postural musculature  Patient has responded very well to treatment and did well with the addition of back extensions over ball  Plan: Continue per plan of care  1:1 30 min      Insurance:  AMA/CMS Eval/ Re-eval POC expires Amye Creamer #/ Referral # Total   Visits  Start date  Expiration date Extension  Visit limitation? PT only or  PT+OT?  Co-Insurance   BCBS   Auth submitted     BOMN PT No       [id number] 12  -                                                       AUTH #: Approved Date 4/12 4/17 4/19 4/24 4/26 5/1 5/3 5/8 5/11 5/16 5/22 5/25   Visits  Authed: 12 Used 1 1 1 1 1 1 1 1 1 1 1 1    Remaining  11 10 9 8 7 6 5 4 3 2 1 0        AUTH #: Approved Date /          Visits  Authed: 12 Used 1 1 1 1 1           Remaining  11 10 9 8 7               Precautions: standard  Medical History        Past Medical History:   Diagnosis Date   • Abnormal Pap smear of cervix     • Anxiety     • Binge eating disorder       on wellbutrin   • COVID-19 07/15/2022             Date 23   Visit Number 12 13 14 15 16 17             Manual         P-A mobs         Anterior innominate mobilization         STM Pin and stretch abdominals with scar tissue massage Pin and stretch abdominals with scar tissue massage Pin and stretch abdominals with scar tissue massage  Pin and stretch abdominals with scar tissue massage Pin and stretch abdominals with scar tissue massage "            Neuro Re-ed         TrA progression         TA+ march         TA + SLR    Bicycle 3x30s  Bicycle 3x30s     Standing lat pulldown with back extension BTB 25x  Standing lat pulldown with back extension BTB 25x    pilates leg circles 15x ea direction   Hip add squeeze TA march 25x  TA single leg extension TA single leg extension   Table top heel tap 20x   SLR \"rainbow\"  Bridge w ADD squeeze 20x Allegra Srivastava w/ ADD squeeze 20x Bridge with leg ext 2x10  Bridge w/ leg ext 2x10    SOC         Bridge  2x10 w/ add squeeze 2*10 w/ ADD squeeze 2*10 w/ ADD squeeze      Hooklying clamshells  Step ups with contralateral hip drive and overhead press 2x10 B/L 4# DB on 8\" step step ups with various foot work 5 min with arm openers    step ups with various foot work 5 min with arm openers   SEVERO         Hip ABD/ ADD SL clamshells 2x10 B/L  SL clamshells 2x10 B/L  SL clamshells 2x10 B/L  SL clamshells 2x10 B/L  GTB SL clamshells 2x10 B/L GTB    Left hip flexion/ knee to chest Foot on step behind, back ext with arm stretch 2x10  Foot on step behind, back ext with arm stretch 2x10 Foot on step behind, back ext with arm stretch 2x10 Foot on step behind, back ext with arm stretch 2x10 Foot on step behind, back ext with arm stretch 2x10 Foot on step behind, back ext with arm stretch 2x10   Prone press up         Thera Ex         CHARLY/REIL         TB shoulder ext         TB shoulder row         Paloff press     ABC 2 sets ABC 2 sets    Anti-rotation press          band \"pull the sword\"         Lateral walk       Back extensions over ball 3 min   Sit to stand         Cat cow          cat to glutes on heels TRX supermans 2x10  TRX supermans 2x10  TRX supermans 2x10 TRX supermans 2x10 TRX supermans 2x10 TRX supermans 2x10   Reciprocal gait with canes  Chops w/ 7 5# on CC 20 B/L   Chops w/ 7 5# on CC 20 B/L Chops w/ 7 5# on CC 20 B/L Chops w/ 7 5# on CC 20 B/L Chops w/ 7 5# on CC 20 B/L   KB swings KB squats   1x10 18#  1x10 9#      " Step hip flexor stretch          LTR                  Pointer          wisdom pose with walkout         Thera Activity         Patient education         Lifting mechanics         Posture education                                                           Modalities

## 2023-06-19 ENCOUNTER — OFFICE VISIT (OUTPATIENT)
Dept: PHYSICAL THERAPY | Facility: CLINIC | Age: 53
End: 2023-06-19
Payer: COMMERCIAL

## 2023-06-19 DIAGNOSIS — Z98.890 S/P ABDOMINOPLASTY: Primary | ICD-10-CM

## 2023-06-19 DIAGNOSIS — M54.2 NECK PAIN: ICD-10-CM

## 2023-06-19 PROCEDURE — 97112 NEUROMUSCULAR REEDUCATION: CPT

## 2023-06-19 PROCEDURE — 97110 THERAPEUTIC EXERCISES: CPT

## 2023-06-19 NOTE — PROGRESS NOTES
Daily Note     Today's date: 2023  Patient name: Tomas Ochoa  : 1970  MRN: [id number]  Referring provider: Kamala Helton MD  Dx:   Encounter Diagnosis     ICD-10-CM    1  S/P abdominoplasty  Z98 890       2  Neck pain  M54 2           Start Time: 1100  Stop Time: 1145  Total time in clinic (min): 45 minutes    Subjective: Reports feeling pretty good      Objective: See treatment diary below      Assessment: Tolerated treatment well  Patient would benefit from continued PT in order to continue working on abdominal strength and increasing tissue extensibility  Executed all new exercises with good form  Continue to work on opening movements for thoracic expansion  Plan: Continue per plan of care  1:1 30 min      Insurance:  AMA/CMS Eval/ Re-eval POC expires Selam Grills #/ Referral # Total   Visits  Start date  Expiration date Extension  Visit limitation? PT only or  PT+OT?  Co-Insurance   BCBS   Auth submitted     BOMN PT No       [id number] 12  -                                                       AUTH #: Approved Date 4/12 4/17 4/19 4/24 4/26 5/1 5/3 5/8 5/11 5/16 5/22 5/25   Visits  Authed: 12 Used 1 1 1 1 1 1 1 1 1 1 1 1    Remaining  11 10 9 8 7 6 5 4 3 2 1 0        AUTH #: Approved Date /          Visits  Authed: 12 Used 1 1 1 1 1           Remaining  11 10 9 8 7               Precautions: standard  Medical History        Past Medical History:   Diagnosis Date   • Abnormal Pap smear of cervix     • Anxiety     • Binge eating disorder       on wellbutrin   • COVID-19 07/15/2022             Date 23   Visit Number 12 13 14 15 16 17 18              Manual          P-A mobs          Anterior innominate mobilization          STM Pin and stretch abdominals with scar tissue massage Pin and stretch abdominals with scar tissue massage Pin and stretch abdominals with scar tissue massage  Pin and stretch "abdominals with scar tissue massage Pin and stretch abdominals with scar tissue massage Pin and stretch abdominals with scar tissue massage              Neuro Re-ed          TrA progression          TA+ march          TA + SLR    Bicycle 3x30s  Bicycle 3x30s      Standing lat pulldown with back extension BTB 25x  Standing lat pulldown with back extension BTB 25x    pilates leg circles 15x ea direction pilates leg circles 15x ea direction   Hip add squeeze TA march 25x  TA single leg extension TA single leg extension   Table top heel tap 20x Bridges 30x   SLR \"rainbow\"  Bridge w ADD squeeze 20x Linn Shoulder w/ ADD squeeze 20x Bridge with leg ext 2x10  Bridge w/ leg ext 2x10     SOC          Bridge  2x10 w/ add squeeze 2*10 w/ ADD squeeze 2*10 w/ ADD squeeze       Hooklying clamshells  Step ups with contralateral hip drive and overhead press 2x10 B/L 4# DB on 8\" step step ups with various foot work 5 min with arm openers    step ups with various foot work 5 min with arm openers step ups with various foot work 5 min with arm openers   SEVERO          Hip ABD/ ADD SL clamshells 2x10 B/L  SL clamshells 2x10 B/L  SL clamshells 2x10 B/L  SL clamshells 2x10 B/L  GTB SL clamshells 2x10 B/L GTB     Left hip flexion/ knee to chest Foot on step behind, back ext with arm stretch 2x10  Foot on step behind, back ext with arm stretch 2x10 Foot on step behind, back ext with arm stretch 2x10 Foot on step behind, back ext with arm stretch 2x10 Foot on step behind, back ext with arm stretch 2x10 Foot on step behind, back ext with arm stretch 2x10 foot on step behind, back ext with arm stretch 2x10   Prone press up          Thera Ex          CHARLY/REIL          TB shoulder ext          TB shoulder row          Paloff press     ABC 2 sets ABC 2 sets     Anti-rotation press       Half kneel with HS engagement with row 12 5# 2*10    band \"pull the sword\"          Lateral walk       Back extensions over ball 3 min Back extensions over ball 3 min " Sit to stand          Cat cow           cat to glutes on heels TRX supermans 2x10  TRX supermans 2x10  TRX supermans 2x10 TRX supermans 2x10 TRX supermans 2x10 TRX supermans 2x10 TRX supermans 2x10   Reciprocal gait with canes  Chops w/ 7 5# on CC 20 B/L   Chops w/ 7 5# on CC 20 B/L Chops w/ 7 5# on CC 20 B/L Chops w/ 7 5# on CC 20 B/L Chops w/ 7 5# on CC 20 B/L Chops w/ 7 5# on CC 20 B/L   KB swings KB squats   1x10 18#  1x10 9#      Open books in standing 2*10 b/l   Step hip flexor stretch           LTR                    Pointer           wisdom pose with walkout          Thera Activity          Patient education          Lifting mechanics          Posture education                                                                 Modalities

## 2023-06-21 ENCOUNTER — OFFICE VISIT (OUTPATIENT)
Dept: PHYSICAL THERAPY | Facility: CLINIC | Age: 53
End: 2023-06-21
Payer: COMMERCIAL

## 2023-06-21 DIAGNOSIS — Z98.890 S/P ABDOMINOPLASTY: Primary | ICD-10-CM

## 2023-06-21 DIAGNOSIS — M54.2 NECK PAIN: ICD-10-CM

## 2023-06-21 PROCEDURE — 97112 NEUROMUSCULAR REEDUCATION: CPT

## 2023-06-21 PROCEDURE — 97110 THERAPEUTIC EXERCISES: CPT

## 2023-06-21 NOTE — PROGRESS NOTES
Daily Note     Today's date: 2023  Patient name: Ariel Madera  : 1970  MRN: [id number]  Referring provider: Devon Ridley MD  Dx:   Encounter Diagnosis     ICD-10-CM    1  S/P abdominoplasty  Z98 890       2  Neck pain  M54 2           Start Time: 930  Stop Time: 1015  Total time in clinic (min): 45 minutes    Subjective: Reports feeling pretty good      Objective: See treatment diary below      Assessment: Tolerated treatment well  Patient would benefit from continued PT  In order to strengthen core  Patient is doing well with increased abdominal range of motion and increased tissue extensibility of abdomen  Is also building stamina  Plan: Continue per plan of care  1:1 30 min      Insurance:  AMA/CMS Eval/ Re-eval POC expires Lodema Kindler #/ Referral # Total   Visits  Start date  Expiration date Extension  Visit limitation? PT only or  PT+OT?  Co-Insurance   BCBS   Auth submitted     BOMN PT No       [id number] 12  -                                                       AUTH #: Approved Date 4/12 4/17 4/19 4/24 4/26 5/1 5/3 5/8 5/11 5/16 5/22 5/25   Visits  Authed: 12 Used 1 1 1 1 1 1 1 1 1 1 1 1    Remaining  11 10 9 8 7 6 5 4 3 2 1 0        AUTH #: Approved Date /         Visits  Authed: 12 Used 1 1 1 1 1 1 1         Remaining  11 10 9 8 7 6 5             Precautions: standard  Medical History         Past Medical History:    Diagnosis Date    • Abnormal Pap smear of cervix      • Anxiety      • Binge eating disorder        on wellbutrin    • COVID-19 07/15/2022              Date 23   Visit Number 13 14 15 16 17 18 19              Manual          P-A mobs          Anterior innominate mobilization          STM Pin and stretch abdominals with scar tissue massage Pin and stretch abdominals with scar tissue massage  Pin and stretch abdominals with scar tissue massage Pin and stretch abdominals "with scar tissue massage Pin and stretch abdominals with scar tissue massage Pin and stretch abdominals with scar tissue massage              Neuro Re-ed          TrA progression          TA+ march          TA + SLR   Bicycle 3x30s  Bicycle 3x30s       Standing lat pulldown with back extension BTB 25x    pilates leg circles 15x ea direction pilates leg circles 15x ea direction Half kneel forward press 7 5 # 16x b/l  Rear pull 20x 12 5#   Hip add squeeze TA single leg extension TA single leg extension   Table top heel tap 20x Bridges 30x    SLR \"rainbow\" Bridge w ADD squeeze 20x Thiago Kasal w/ ADD squeeze 20x Bridge with leg ext 2x10  Bridge w/ leg ext 2x10      SOC          Bridge  2*10 w/ ADD squeeze 2*10 w/ ADD squeeze        Hooklying clamshells  step ups with various foot work 5 min with arm openers    step ups with various foot work 5 min with arm openers step ups with various foot work 5 min with arm openers step ups with various foot work 5 min with arm openers + BTB   SEVERO          Hip ABD/ ADD SL clamshells 2x10 B/L  SL clamshells 2x10 B/L  SL clamshells 2x10 B/L  GTB SL clamshells 2x10 B/L GTB      Left hip flexion/ knee to chest Foot on step behind, back ext with arm stretch 2x10 Foot on step behind, back ext with arm stretch 2x10 Foot on step behind, back ext with arm stretch 2x10 Foot on step behind, back ext with arm stretch 2x10 Foot on step behind, back ext with arm stretch 2x10 foot on step behind, back ext with arm stretch 2x10    Prone press up          Thera Ex          CHARLY/REIL          TB shoulder ext          TB shoulder row          Paloff press    ABC 2 sets ABC 2 sets   pec horizontal ADD to midline   Anti-rotation press      Half kneel with HS engagement with row 12 5# 2*10 Reverse pec fly with blue loop 20x    band \"pull the sword\"          Lateral walk      Back extensions over ball 3 min Back extensions over ball 3 min Back extensions over ball 3 min   Sit to stand          Cat cow        "  cat to glutes on heels TRX supermans 2x10  TRX supermans 2x10 TRX supermans 2x10 TRX supermans 2x10 TRX supermans 2x10 TRX supermans 2x10 TRX supermans 2x10   Reciprocal gait with canes   Chops w/ 7 5# on CC 20 B/L Chops w/ 7 5# on CC 20 B/L Chops w/ 7 5# on CC 20 B/L Chops w/ 7 5# on CC 20 B/L Chops w/ 7 5# on CC 20 B/L Sled push with 90# 125' x 2   KB swings      Open books in standing 2*10 b/l open books in standing 2*10 b/l   Step hip flexor stretch           LTR                    Pointer           wisdom pose with walkout          Thera Activity          Patient education          Lifting mechanics          Posture education                                                                 Modalities

## 2023-06-21 NOTE — PROGRESS NOTES
Daily Note     Today's date: 2023  Patient name: Becky Figueroa  : 1970  MRN: [id number]  Referring provider: Thom Daugherty MD  Dx:   Encounter Diagnosis     ICD-10-CM    1  S/P abdominoplasty  Z98 890       2  Neck pain  M54 2           Start Time: 09  Stop Time: 1015  Total time in clinic (min): 45 minutes    Subjective: Reports feeling pretty good      Objective: See treatment diary below      Assessment: Tolerated treatment well  Patient would benefit from continued PT  In order to strengthen core  Patient is doing well with increased abdominal range of motion and increased tissue extensibility of abdomen  Is also building stamina  Plan: Continue per plan of care  1:1 30 min      Insurance:  AMA/CMS Eval/ Re-eval POC expires Ariana Mable #/ Referral # Total   Visits  Start date  Expiration date Extension  Visit limitation? PT only or  PT+OT?  Co-Insurance   BCBS   Auth submitted     BOMN PT No       [id number] 12  -                                                       AUTH #: Approved Date 4/12 4/17 4/19 4/24 4/26 5/1 5/3 5/8 5/11 5/16 5/22 5/25   Visits  Authed: 12 Used 1 1 1 1 1 1 1 1 1 1 1 1    Remaining  11 10 9 8 7 6 5 4 3 2 1 0        AUTH #: Approved Date          Visits  Authed: 12 Used 1 1 1 1 1 1          Remaining  11 10 9 8 7 6              Precautions: standard  Medical History         Past Medical History:    Diagnosis Date    • Abnormal Pap smear of cervix      • Anxiety      • Binge eating disorder        on wellbutrin    • COVID-19 07/15/2022              Date 23   Visit Number 13 14 15 16 17 18 19              Manual          P-A mobs          Anterior innominate mobilization          STM Pin and stretch abdominals with scar tissue massage Pin and stretch abdominals with scar tissue massage  Pin and stretch abdominals with scar tissue massage Pin and stretch abdominals with "scar tissue massage Pin and stretch abdominals with scar tissue massage Pin and stretch abdominals with scar tissue massage              Neuro Re-ed          TrA progression          TA+ march          TA + SLR   Bicycle 3x30s  Bicycle 3x30s       Standing lat pulldown with back extension BTB 25x    pilates leg circles 15x ea direction pilates leg circles 15x ea direction Half kneel forward press 7 5 # 16x b/l  Rear pull 20x 12 5#   Hip add squeeze TA single leg extension TA single leg extension   Table top heel tap 20x Bridges 30x    SLR \"rainbow\" Bridge w ADD squeeze 20x Linn Shoulder w/ ADD squeeze 20x Bridge with leg ext 2x10  Bridge w/ leg ext 2x10      SOC          Bridge  2*10 w/ ADD squeeze 2*10 w/ ADD squeeze        Hooklying clamshells  step ups with various foot work 5 min with arm openers    step ups with various foot work 5 min with arm openers step ups with various foot work 5 min with arm openers    SEVERO          Hip ABD/ ADD SL clamshells 2x10 B/L  SL clamshells 2x10 B/L  SL clamshells 2x10 B/L  GTB SL clamshells 2x10 B/L GTB      Left hip flexion/ knee to chest Foot on step behind, back ext with arm stretch 2x10 Foot on step behind, back ext with arm stretch 2x10 Foot on step behind, back ext with arm stretch 2x10 Foot on step behind, back ext with arm stretch 2x10 Foot on step behind, back ext with arm stretch 2x10 foot on step behind, back ext with arm stretch 2x10    Prone press up          Thera Ex          CHARLY/REIL          TB shoulder ext          TB shoulder row          Paloff press    ABC 2 sets ABC 2 sets      Anti-rotation press      Half kneel with HS engagement with row 12 5# 2*10     band \"pull the sword\"          Lateral walk      Back extensions over ball 3 min Back extensions over ball 3 min    Sit to stand          Cat cow           cat to glutes on heels TRX supermans 2x10  TRX supermans 2x10 TRX supermans 2x10 TRX supermans 2x10 TRX supermans 2x10 TRX supermans 2x10    Reciprocal gait " with canes   Chops w/ 7 5# on CC 20 B/L Chops w/ 7 5# on CC 20 B/L Chops w/ 7 5# on CC 20 B/L Chops w/ 7 5# on CC 20 B/L Chops w/ 7 5# on CC 20 B/L    KB swings      Open books in standing 2*10 b/l    Step hip flexor stretch           LTR                    Pointer           wisdom pose with walkout          Thera Activity          Patient education          Lifting mechanics          Posture education                                                                 Modalities

## 2023-06-23 ENCOUNTER — PROCEDURE VISIT (OUTPATIENT)
Dept: OBGYN CLINIC | Facility: CLINIC | Age: 53
End: 2023-06-23
Payer: COMMERCIAL

## 2023-06-23 VITALS
DIASTOLIC BLOOD PRESSURE: 80 MMHG | WEIGHT: 132 LBS | BODY MASS INDEX: 23.39 KG/M2 | HEIGHT: 63 IN | SYSTOLIC BLOOD PRESSURE: 104 MMHG

## 2023-06-23 DIAGNOSIS — N95.0 POSTMENOPAUSAL BLEEDING: Primary | ICD-10-CM

## 2023-06-23 PROCEDURE — 58100 BIOPSY OF UTERUS LINING: CPT | Performed by: STUDENT IN AN ORGANIZED HEALTH CARE EDUCATION/TRAINING PROGRAM

## 2023-06-23 NOTE — PROGRESS NOTES
"[facility]  Postmenopausal bleeidng  Dolores Miramontes MD  06/23/23      Win Yeh is a 48 yo postmenopausal female with history of endometrial ablation presenting for endometrial sampling- had history of PMB- prior attempt of endometrial biopsy in office that was unsuccessful followed by uterine perforation at time of D&C with notable polyp  She has had no bleeding since [facility]  but is interested in EMB today and was previously counseled  She took cytotec in preparation for biopsy and cervical stenosis today  /80 (BP Location: Left arm, Patient Position: Sitting)   Ht 5' 3\" (1 6 m)   Wt 59 9 kg (132 lb)   LMP 11/16/2022 (Approximate) Comment: ablation  BMI 23 38 kg/m²   Physical Exam  Vitals reviewed  Constitutional:       General: She is not in acute distress  Appearance: Normal appearance  She is not ill-appearing or diaphoretic  Cardiovascular:      Rate and Rhythm: Normal rate  Pulmonary:      Effort: Pulmonary effort is normal  No respiratory distress  Abdominal:      Palpations: Abdomen is soft  Tenderness: There is no abdominal tenderness  There is no guarding or rebound  Genitourinary:     Comments: Vulva atrophic, no lesions  Urethra midline without prolapse  Vagina and cervix appear atrophic without lesions  Cervix stenotic needing to be dilated then uterus sounded to 7cm  Musculoskeletal:      Right lower leg: No edema  Left lower leg: No edema  Skin:     General: Skin is warm and dry  Neurological:      Mental Status: She is alert and oriented to person, place, and time  Psychiatric:         Mood and Affect: Mood normal          Behavior: Behavior normal            Endometrial biopsy    Date/Time: 6/23/2023 1:00 PM    Performed by: Dolores Miramontes MD  Authorized by: Dolores Miramontes MD  Universal Protocol:  Procedure performed by: Yan Casey MA)  Consent: Verbal consent obtained    Risks and benefits: risks, benefits and alternatives " were discussed  Consent given by: patient      Indication:     Indications: Post-menopausal bleeding      Chronicity of post-menopausal bleeding:  New    Progression of post-menopausal bleeding:  Resolved  Pre-procedure:     Premeds:  Ibuprofen  Procedure:     Procedure: endometrial biopsy with Pipelle      A bivalve speculum was placed in the vagina: yes      Cervix cleaned and prepped: yes      A paracervical block was performed: no      An intracervical block was performed: no      The cervix was dilated: yes (dilated with os finder seocndary to cervical stenosis)      Uterus sounded: yes      Uterus sound depth (cm):  7    Curettes used:  1    Specimen collected: specimen collected and sent to pathology      Specimen collected comment:  Small sample size- 2 passes completed    Patient tolerated procedure well with no complications: yes      Will call with results and counseling for next steps  She understands that given history of endometrial ablation this sample may not be a representative sample of the entire uterine cavity which may not diagnose endometrial cancer if present in non sampled areas

## 2023-06-23 NOTE — PATIENT INSTRUCTIONS
Endometrial Biopsy   WHAT YOU NEED TO KNOW:   Endometrial biopsy is a procedure to remove a tissue sample from the lining of your uterus  This procedure is done through your vagina  DISCHARGE INSTRUCTIONS:   Call your doctor or surgeon if:   You have severe pain that does not go away after you take pain medicine  You have a fever  You have pain or cramping that lasts longer than a few days  You have white or yellow vaginal discharge  You have more vaginal bleeding than you were told to expect  You have questions or concerns about your condition or care  Medicines:   NSAIDs , such as ibuprofen, help decrease swelling, pain, and fever  NSAIDs can cause stomach bleeding or kidney problems in certain people  If you take blood thinner medicine, always ask your healthcare provider if NSAIDs are safe for you  Always read the medicine label and follow directions  Take your medicine as directed  Contact your healthcare provider if you think your medicine is not helping or if you have side effects  Tell your provider if you are allergic to any medicine  Keep a list of the medicines, vitamins, and herbs you take  Include the amounts, and when and why you take them  Bring the list or the pill bottles to follow-up visits  Carry your medicine list with you in case of an emergency  Do not have sex, douche, or use a tampon  for at least 10 to 14 days  Follow up with your doctor or surgeon as directed:  Write down your questions so you remember to ask them during your visits  © Copyright [facility] 2022 Information is for End User's use only and may not be sold, redistributed or otherwise used for commercial purposes  The above information is an  only  It is not intended as medical advice for individual conditions or treatments  Talk to your doctor, nurse or pharmacist before following any medical regimen to see if it is safe and effective for you

## 2023-06-29 LAB
PATH REPORT.SITE OF ORIGIN SPEC: NORMAL
PAYMENT PROCEDURE: NORMAL
SL AMB .: NORMAL

## 2023-07-03 ENCOUNTER — OFFICE VISIT (OUTPATIENT)
Dept: PHYSICAL THERAPY | Facility: CLINIC | Age: 53
End: 2023-07-03
Payer: COMMERCIAL

## 2023-07-03 DIAGNOSIS — M54.2 NECK PAIN: ICD-10-CM

## 2023-07-03 DIAGNOSIS — Z98.890 S/P ABDOMINOPLASTY: Primary | ICD-10-CM

## 2023-07-03 PROCEDURE — 97110 THERAPEUTIC EXERCISES: CPT

## 2023-07-03 PROCEDURE — 97112 NEUROMUSCULAR REEDUCATION: CPT

## 2023-07-03 NOTE — PROGRESS NOTES
Daily Note     Today's date: 7/3/2023  Patient name: Rox Castaneda  : 1970  MRN: [id number]  Referring provider: Ron Ochoa MD  Dx:   Encounter Diagnosis     ICD-10-CM    1. S/P abdominoplasty  Z98.890       2. Neck pain  M54.2           Start Time: 1015  Stop Time: 1100  Total time in clinic (min): 45 minutes    Subjective: Patient reports returning from cruise      Objective: See treatment diary below      Assessment: Tolerated treatment well. Patient would benefit from continued PT in order to build core strength to support abdominal surgery. Patient has made good progress toward goals and has made advances. Did well with the addition of weighted walkouts. Continue to progress with strengthening. Plan: Continue per plan of care. 1:1 30 min      Insurance:  AMA/CMS Eval/ Re-eval POC expires Jean Claude Hamman #/ Referral # Total   Visits  Start date  Expiration date Extension  Visit limitation? PT only or  PT+OT?  Co-Insurance   BCBS   Auth submitted     BOMN PT No       [id number] 12  -                                                       AUTH #: Approved Date 4/12 4/17 4/19 4/24 4/26 5/1 5/3 5/8 5/11 5/16 5/22 5/25   Visits  Authed: 12 Used 1 1 1 1 1 1 1 1 1 1 1 1    Remaining  11 10 9 8 7 6 5 4 3 2 1 0        AUTH #: Approved Date / 6/19 6/21 7/3/23       Visits  Authed: 12 Used 1 1 1 1 1 1 1 1        Remaining  11 10 9 8 7 6 5 4            Precautions: standard  Medical History         Past Medical History:    Diagnosis Date    • Abnormal Pap smear of cervix      • Anxiety      • Binge eating disorder        on wellbutrin    • COVID-19 07/15/2022              Date 6/1/23 6/5/23 6/8/23 6/12/23 6/14/23 6/19/23 6/21/23 7/3/23   Visit Number 13 14 15 16 17 18 19                Manual           P-A mobs           Anterior innominate mobilization           STM Pin and stretch abdominals with scar tissue massage Pin and stretch abdominals with scar tissue massage Pin and stretch abdominals with scar tissue massage Pin and stretch abdominals with scar tissue massage Pin and stretch abdominals with scar tissue massage Pin and stretch abdominals with scar tissue massage Pin and stretch abdominals with scar tissue massage               Neuro Re-ed           TrA progression           TA+ march           TA + SLR   Bicycle 3x30s  Bicycle 3x30s        Standing lat pulldown with back extension BTB 25x    pilates leg circles 15x ea direction pilates leg circles 15x ea direction Half kneel forward press 7.5 # 16x b/l  Rear pull 20x 12.5# Half kneel forward press 7.5 # 16x b/l  Rear pull 20x 12.5#   Hip add squeeze TA single leg extension TA single leg extension   Table top heel tap 20x Bridges 30x  KB throw 8# 20x   SLR "rainbow" Bridge w ADD squeeze 20x Paxton Rangel w/ ADD squeeze 20x Bridge with leg ext 2x10  Bridge w/ leg ext 2x10    Single arm row 18#   SOC           Bridge  2*10 w/ ADD squeeze 2*10 w/ ADD squeeze      Doorway pec stretch 10x 10 sec hold   Hooklying clamshells  step ups with various foot work 5 min with arm openers    step ups with various foot work 5 min with arm openers step ups with various foot work 5 min with arm openers step ups with various foot work 5 min with arm openers + BTB step ups with various foot work 5 min with arm openers + BTB   SEVERO           Hip ABD/ ADD SL clamshells 2x10 B/L  SL clamshells 2x10 B/L  SL clamshells 2x10 B/L  GTB SL clamshells 2x10 B/L GTB    Standing rows 20x 12.5#   Left hip flexion/ knee to chest Foot on step behind, back ext with arm stretch 2x10 Foot on step behind, back ext with arm stretch 2x10 Foot on step behind, back ext with arm stretch 2x10 Foot on step behind, back ext with arm stretch 2x10 Foot on step behind, back ext with arm stretch 2x10 foot on step behind, back ext with arm stretch 2x10  Standing extension 2*10 12.5#   Prone press up           Thera Ex           CHARLY/REIL           TB shoulder ext           TB shoulder row           Paloff press    ABC 2 sets ABC 2 sets   pec horizontal ADD to midline    Anti-rotation press      Half kneel with HS engagement with row 12.5# 2*10 Reverse pec fly with blue loop 20x CC walkouts 10x forward/ backward 12.5#    band "pull the sword"           Lateral walk      Back extensions over ball 3 min Back extensions over ball 3 min Back extensions over ball 3 min    Sit to stand           Cat cow            cat to glutes on heels TRX supermans 2x10  TRX supermans 2x10 TRX supermans 2x10 TRX supermans 2x10 TRX supermans 2x10 TRX supermans 2x10 TRX supermans 2x10 TRX supermans 2x10   Reciprocal gait with canes   Chops w/ 7.5# on CC 20 B/L Chops w/ 7.5# on CC 20 B/L Chops w/ 7.5# on CC 20 B/L Chops w/ 7.5# on CC 20 B/L Chops w/ 7.5# on CC 20 B/L Sled push with 90# 125' x 2    KB swings      Open books in standing 2*10 b/l open books in standing 2*10 b/l open books in standing 2*10 b/l   Step hip flexor stretch            LTR                      Pointer            wisdom pose with walkout           Thera Activity           Patient education           Lifting mechanics           Posture education                                                                       Modalities

## 2023-07-05 ENCOUNTER — OFFICE VISIT (OUTPATIENT)
Dept: PHYSICAL THERAPY | Facility: CLINIC | Age: 53
End: 2023-07-05
Payer: COMMERCIAL

## 2023-07-05 DIAGNOSIS — M54.2 NECK PAIN: ICD-10-CM

## 2023-07-05 DIAGNOSIS — Z98.890 S/P ABDOMINOPLASTY: Primary | ICD-10-CM

## 2023-07-05 PROCEDURE — 97110 THERAPEUTIC EXERCISES: CPT | Performed by: PHYSICAL THERAPIST

## 2023-07-05 PROCEDURE — 97112 NEUROMUSCULAR REEDUCATION: CPT | Performed by: PHYSICAL THERAPIST

## 2023-07-05 NOTE — PROGRESS NOTES
Daily Note     Today's date: 2023  Patient name: Bertha Foster  : 1970  MRN: [id number]  Referring provider: Padmini Crowell MD  Dx:   Encounter Diagnosis     ICD-10-CM    1. S/P abdominoplasty  Z98.890       2. Neck pain  M54.2                      Subjective: Pt reports that she feels well and is pleased with progress. States that she has noted improvements in symptoms and denies any pain. Objective: See treatment diary below      Assessment: Tolerated treatment well. Patient demonstrated fatigue post treatment, exhibited good technique with therapeutic exercises and would benefit from continued PT      Plan: Continue per plan of care. 1:1 30 min      Insurance:  AMA/CMS Eval/ Re-eval POC expires Lenda Fears #/ Referral # Total   Visits  Start date  Expiration date Extension  Visit limitation? PT only or  PT+OT?  Co-Insurance   BCBS   Auth submitted     BOMN PT No       [id number] 12  -                                                       AUTH #: Approved Date 4/12 4/17 4/19 4/24 4/26 5/1 5/3 5/8 5/11 5/16 5/22 5/25   Visits  Authed: 12 Used 1 1 1 1 1 1 1 1 1 1 1 1    Remaining  11 10 9 8 7 6 5 4 3 2 1 0        AUTH #: Approved Date / 6/19 6/21 7/3/23       Visits  Authed: 12 Used 1 1 1 1 1 1 1 1        Remaining  11 10 9 8 7 6 5 4            Precautions: standard  Medical History         Past Medical History:    Diagnosis Date    • Abnormal Pap smear of cervix      • Anxiety      • Binge eating disorder        on wellbutrin    • COVID-19 07/15/2022              Date 23   Visit Number 13 14 15 16 17 18 19                Manual           P-A mobs           Anterior innominate mobilization           STM Pin and stretch abdominals with scar tissue massage Pin and stretch abdominals with scar tissue massage  Pin and stretch abdominals with scar tissue massage Pin and stretch abdominals with scar tissue massage Pin and stretch abdominals with scar tissue massage Pin and stretch abdominals with scar tissue massage                Neuro Re-ed           TrA progression           TA+ march           TA + SLR   Bicycle 3x30s  Bicycle 3x30s        Standing lat pulldown with back extension BTB 25x    pilates leg circles 15x ea direction pilates leg circles 15x ea direction Half kneel forward press 7.5 # 16x b/l  Rear pull 20x 12.5# Half kneel forward press 7.5 # 16x b/l  Rear pull 20x 12.5#   Hip add squeeze TA single leg extension TA single leg extension   Table top heel tap 20x Bridges 30x  KB throw 8# 20x   SLR "rainbow" Bridge w ADD squeeze 20x Bridges w/ ADD squeeze 20x Bridge with leg ext 2x10  Bridge w/ leg ext 2x10       SOC           Bridge  2*10 w/ ADD squeeze 2*10 w/ ADD squeeze      Doorway pec stretch 10x 10 sec hold   Hooklying clamshells  step ups with various foot work 5 min with arm openers    step ups with various foot work 5 min with arm openers step ups with various foot work 5 min with arm openers step ups with various foot work 5 min with arm openers + BTB step ups with various foot work 5 min with arm openers + BTB   SEVERO           Hip ABD/ ADD SL clamshells 2x10 B/L  SL clamshells 2x10 B/L  SL clamshells 2x10 B/L  GTB SL clamshells 2x10 B/L GTB    Standing rows 20x 12.5#   Left hip flexion/ knee to chest Foot on step behind, back ext with arm stretch 2x10 Foot on step behind, back ext with arm stretch 2x10 Foot on step behind, back ext with arm stretch 2x10 Foot on step behind, back ext with arm stretch 2x10 Foot on step behind, back ext with arm stretch 2x10 foot on step behind, back ext with arm stretch 2x10  Standing extension 2*10 12.5#   Prone press up           Thera Ex           CHARLY/REIL           TB shoulder ext           TB shoulder row           Paloff press    ABC 2 sets ABC 2 sets   pec horizontal ADD to midline    Anti-rotation press      Half kneel with HS engagement with row 12.5# 2*10 Reverse pec fly with blue loop 20x Anti-rotation press 2 x 10 #12.5    band "pull the sword"           Lateral walk      Back extensions over ball 3 min Back extensions over ball 3 min Back extensions over ball 3 min    Sit to stand           Cat cow            cat to glutes on heels TRX supermans 2x10  TRX supermans 2x10 TRX supermans 2x10 TRX supermans 2x10 TRX supermans 2x10 TRX supermans 2x10 TRX supermans 2x10 TRX supermans 2x10   Reciprocal gait with canes   Chops w/ 7.5# on CC 20 B/L Chops w/ 7.5# on CC 20 B/L Chops w/ 7.5# on CC 20 B/L Chops w/ 7.5# on CC 20 B/L Chops w/ 7.5# on CC 20 B/L Sled push with 90# 125' x 2 Sled push with 90lbs 125 x 2    KB swings      Open books in standing 2*10 b/l open books in standing 2*10 b/l open books in standing 2*10 b/l   Step hip flexor stretch            LTR                      Pointer            wisdom pose with walkout           Thera Activity           Patient education           Lifting mechanics           Posture education                                                                       Modalities

## 2023-07-10 ENCOUNTER — OFFICE VISIT (OUTPATIENT)
Dept: PHYSICAL THERAPY | Facility: CLINIC | Age: 53
End: 2023-07-10
Payer: COMMERCIAL

## 2023-07-10 DIAGNOSIS — M54.2 NECK PAIN: ICD-10-CM

## 2023-07-10 DIAGNOSIS — Z98.890 S/P ABDOMINOPLASTY: Primary | ICD-10-CM

## 2023-07-10 PROCEDURE — 97110 THERAPEUTIC EXERCISES: CPT

## 2023-07-10 PROCEDURE — 97112 NEUROMUSCULAR REEDUCATION: CPT

## 2023-07-10 NOTE — PROGRESS NOTES
Daily Note     Today's date: 7/10/2023  Patient name: Adri Uriostegui  : 1970  MRN: [id number]  Referring provider: Juan George MD  Dx:   Encounter Diagnosis     ICD-10-CM    1. S/P abdominoplasty  Z98.890       2. Neck pain  M54.2           Start Time: 1015  Stop Time: 1100  Total time in clinic (min): 45 minutes    Subjective: "I feel pretty good" had some random tightness      Objective: See treatment diary below      Assessment: Tolerated treatment well. Patient would benefit from continued PT in order to continue building core strength and ROM. Patient has done very well on program and will likely discharge next session to extensive HEP. Positive in forward flexion and supine to sit test for posteriorly rotated left innominate. Plan: Continue per plan of care. 1:1 30 min      Insurance:  AMA/CMS Eval/ Re-eval POC expires Mark Bers #/ Referral # Total   Visits  Start date  Expiration date Extension  Visit limitation? PT only or  PT+OT?  Co-Insurance   BCBS   Auth submitted     BOMN PT No       [id number] 12  -                                                       AUTH #: Approved Date 4/12 4/17 4/19 4/24 4/26 5/1 5/3 5/8 5/11 5/16 5/22 5/25   Visits  Authed: 12 Used 1 1 1 1 1 1 1 1 1 1 1 1    Remaining  11 10 9 8 7 6 5 4 3 2 1 0        AUTH #: Approved Date / 6/19 6/21 7/3/23 7/10      Visits  Authed: 12 Used 1 1 1 1 1 1 1 1 1       Remaining  11 10 9 8 7 6 5 4 1           Precautions: standard  Medical History         Past Medical History:    Diagnosis Date    • Abnormal Pap smear of cervix      • Anxiety      • Binge eating disorder        on wellbutrin    • COVID-19 07/15/2022              Date 6/8/23 6/12/23 6/14/23 6/19/23 6/21/23 7/5/23 7/10/23   Visit Number 15 16 17 18 19                Manual          P-A mobs          Anterior innominate mobilization       Gr V left with patient permission   STM  Pin and stretch abdominals with scar tissue massage Pin and stretch abdominals with scar tissue massage Pin and stretch abdominals with scar tissue massage Pin and stretch abdominals with scar tissue massage                Neuro Re-ed          TrA progression          TA+ march       Bridges w ADD squeeze 20x   TA + SLR Bicycle 3x30s  Bicycle 3x30s           pilates leg circles 15x ea direction pilates leg circles 15x ea direction Half kneel forward press 7.5 # 16x b/l  Rear pull 20x 12.5# Half kneel forward press 7.5 # 16x b/l  Rear pull 20x 12.5# Cat cow 20x  Bird dog 20x   Hip add squeeze   Table top heel tap 20x Bridges 30x  KB throw 8# 20x KB throw 8# 20x   SLR "rainbow" Bridge with leg ext 2x10  Bridge w/ leg ext 2x10        SOC          Bridge       Doorway pec stretch 10x 10 sec hold Doorway pec stretch 10x 10 sec hold   Hooklying clamshells    step ups with various foot work 5 min with arm openers step ups with various foot work 5 min with arm openers step ups with various foot work 5 min with arm openers + BTB step ups with various foot work 5 min with arm openers + BTB step ups with various foot work 5 min with arm openers + BTB   SEVERO          Hip ABD/ ADD SL clamshells 2x10 B/L  GTB SL clamshells 2x10 B/L GTB    Standing rows 20x 12.5# Standing rows 20x 12.5#   Left hip flexion/ knee to chest Foot on step behind, back ext with arm stretch 2x10 Foot on step behind, back ext with arm stretch 2x10 Foot on step behind, back ext with arm stretch 2x10 foot on step behind, back ext with arm stretch 2x10  Standing extension 2*10 12.5# Standing extension 2*10 12.5#   Prone press up          Thera Ex          CHARLY/REIL          TB shoulder ext          TB shoulder row          Paloff press  ABC 2 sets ABC 2 sets   pec horizontal ADD to midline     Anti-rotation press    Half kneel with HS engagement with row 12.5# 2*10 Reverse pec fly with blue loop 20x Anti-rotation press 2 x 10 #12.5 D2 pattern b/l 7.5# 15 x    band "pull the sword"          Lateral walk Back extensions over ball 3 min Back extensions over ball 3 min Back extensions over ball 3 min  Back extensions over ball 3 min   Sit to stand          Cat cow           cat to glutes on heels TRX supermans 2x10 TRX supermans 2x10 TRX supermans 2x10 TRX supermans 2x10 TRX supermans 2x10 TRX supermans 2x10 TRX supermans 2x10   Reciprocal gait with canes  Chops w/ 7.5# on CC 20 B/L Chops w/ 7.5# on CC 20 B/L Chops w/ 7.5# on CC 20 B/L Chops w/ 7.5# on CC 20 B/L Sled push with 90# 125' x 2 Sled push with 90lbs 125 x 2  Sled push with 90lbs 125 x 2   KB swings    Open books in standing 2*10 b/l open books in standing 2*10 b/l open books in standing 2*10 b/l open books in standing 2*10 b/l   Step hip flexor stretch           LTR                    Pointer           wisdom pose with walkout          Thera Activity          Patient education          Lifting mechanics          Posture education                                                                 Modalities

## 2023-07-12 ENCOUNTER — OFFICE VISIT (OUTPATIENT)
Dept: PHYSICAL THERAPY | Facility: CLINIC | Age: 53
End: 2023-07-12
Payer: COMMERCIAL

## 2023-07-12 DIAGNOSIS — Z98.890 S/P ABDOMINOPLASTY: Primary | ICD-10-CM

## 2023-07-12 DIAGNOSIS — M54.2 NECK PAIN: ICD-10-CM

## 2023-07-12 PROCEDURE — 97110 THERAPEUTIC EXERCISES: CPT

## 2023-07-12 PROCEDURE — 97112 NEUROMUSCULAR REEDUCATION: CPT

## 2023-08-03 ENCOUNTER — RA CDI HCC (OUTPATIENT)
Dept: OTHER | Facility: HOSPITAL | Age: 53
End: 2023-08-03

## 2023-08-03 NOTE — PROGRESS NOTES
[address] [facility] coding opportunities       Chart reviewed, no opportunity found: CHART REVIEWED, NO OPPORTUNITY FOUND        Patients Insurance        Commercial Insurance: [address]

## 2023-08-11 ENCOUNTER — OFFICE VISIT (OUTPATIENT)
Dept: FAMILY MEDICINE CLINIC | Facility: CLINIC | Age: 53
End: 2023-08-11
Payer: COMMERCIAL

## 2023-08-11 VITALS
TEMPERATURE: 97 F | WEIGHT: 135.6 LBS | OXYGEN SATURATION: 100 % | RESPIRATION RATE: 16 BRPM | HEART RATE: 82 BPM | HEIGHT: 63 IN | DIASTOLIC BLOOD PRESSURE: 66 MMHG | SYSTOLIC BLOOD PRESSURE: 108 MMHG | BODY MASS INDEX: 24.03 KG/M2

## 2023-08-11 DIAGNOSIS — Z13.89 SCREENING FOR CARDIOVASCULAR, RESPIRATORY, AND GENITOURINARY DISEASES: ICD-10-CM

## 2023-08-11 DIAGNOSIS — F41.9 ANXIETY: ICD-10-CM

## 2023-08-11 DIAGNOSIS — G89.29 CHRONIC NONINTRACTABLE HEADACHE, UNSPECIFIED HEADACHE TYPE: ICD-10-CM

## 2023-08-11 DIAGNOSIS — Z00.00 WELL ADULT EXAM: Primary | ICD-10-CM

## 2023-08-11 DIAGNOSIS — Z13.29 SCREENING FOR THYROID DISORDER: ICD-10-CM

## 2023-08-11 DIAGNOSIS — Z13.83 SCREENING FOR CARDIOVASCULAR, RESPIRATORY, AND GENITOURINARY DISEASES: ICD-10-CM

## 2023-08-11 DIAGNOSIS — Z13.6 SCREENING FOR CARDIOVASCULAR, RESPIRATORY, AND GENITOURINARY DISEASES: ICD-10-CM

## 2023-08-11 DIAGNOSIS — Z90.3 S/P PARTIAL GASTRECTOMY: ICD-10-CM

## 2023-08-11 DIAGNOSIS — R51.9 CHRONIC NONINTRACTABLE HEADACHE, UNSPECIFIED HEADACHE TYPE: ICD-10-CM

## 2023-08-11 PROCEDURE — 99396 PREV VISIT EST AGE 40-64: CPT | Performed by: FAMILY MEDICINE

## 2023-08-11 NOTE — PROGRESS NOTES
FAMILY PRACTICE HEALTH MAINTENANCE OFFICE VISIT  [facility]    NAME: Yumiko Wallace  AGE: 47 y.o. SEX: female  : 1970     DATE: 2023    Assessment and Plan     1. Well adult exam    2. Screening for cardiovascular, respiratory, and genitourinary diseases  -     CBC and differential; Future  -     Comprehensive metabolic panel; Future  -     Lipid Panel with Direct LDL reflex; Future    3. Screening for thyroid disorder  -     TSH, 3rd generation with Free T4 reflex; Future    4. S/P partial gastrectomy  -     Vitamin D 25 hydroxy; Future  -     Vitamin B6; Future  -     Vitamin B12; Future  -     Vitamin B1, whole blood; Future  -     Vitamin A; Future    5. Chronic nonintractable headache, unspecified headache type  -     Ambulatory Referral to Neurology; Future    6. Anxiety  Assessment & Plan:  Continues to experience anxiety on wellbutrin and prn ativan. Recommend seeing a therapist.     Orders:  -     Ambulatory Referral to [facility]rap[facility]; Future        Patient Counseling:   Nutrition: Stressed importance of a well balanced diet, moderation of sodium/saturated fat, caloric balance and sufficient intake of fiber  Exercise: Stressed the importance of regular exercise with a goal of 150 minutes per week  Dental Health: Discussed daily flossing and brushing and regular dental visits   Immunizations reviewed: Up To Date  Discussed benefits of:  Screening labs. BMI Counseling: Body mass index is 24.02 kg/m². Discussed with patient's BMI with her. The BMI is normal.     No follow-ups on file.         Chief Complaint     Chief Complaint   Patient presents with   • Physical Exam     Carson Kinney CMA    • Headache     Ongoing issue       History of Present Illness     HPI    Well Adult Physical   Patient here for a comprehensive physical exam.      Diet and Physical Activity  Diet: well balanced diet  Exercise: infrequently      Depression Screen  PHQ-2/9 Depression Screening    Little interest or pleasure in doing things: 0 - not at all  Feeling down, depressed, or hopeless: 0 - not at all  PHQ-2 Score: 0  PHQ-2 Interpretation: Negative depression screen          General Health  Hearing: Normal:  bilateral  Vision: most recent eye exam >1 year and wears glasses  Dental: regular dental visits, brushes teeth twice daily and flosses teeth occasionally    Reproductive Health  No issues , Post-menopausal  and Follows with gynecologist      The following portions of the patient's history were reviewed and updated as appropriate: allergies, current medications, past family history, past medical history, past social history, past surgical history and problem list.    Review of Systems     Review of Systems   Constitutional: Negative. HENT: Negative. Eyes: Negative. Respiratory: Negative. Cardiovascular: Negative. Gastrointestinal: Negative. Endocrine: Negative. Genitourinary: Negative. Musculoskeletal: Negative. Skin: Negative. Allergic/Immunologic: Negative. Neurological: Negative. Hematological: Negative. Psychiatric/Behavioral: Negative.         Past Medical History     Past Medical History:   Diagnosis Date   • Abnormal Pap smear of cervix    • Anxiety    • Binge eating disorder     on wellbutrin   • Breakthrough bleeding     D&C scheduled for 11/16/22   • COVID-19 07/15/2022   • Fibroid    • Genital warts    • Miscarriage        Past Surgical History     Past Surgical History:   Procedure Laterality Date   • CERVICAL BIOPSY  W/ LOOP ELECTRODE EXCISION      Age 26   • CHOLECYSTECTOMY     • COSMETIC SURGERY  12/2022    tummy tuck   • GASTRIC BYPASS     • MI HYSTEROSCOPY BX ENDOMETRIUM&/POLYPC W/WO D&C N/A 11/16/2022    Procedure: HYSTEROSCOPY, DILATATION AND ENDOMETRIAL BIOPSY;  Surgeon: Ismael Locke MD;  [facility];  Service: Gynecology   • TUBAL LIGATION         Social History     Social History Socioeconomic History   • Marital status: Single     Spouse name: None   • Number of children: None   • Years of education: None   • Highest education level: None   Occupational History   • None   Tobacco Use   • Smoking status: Never     Passive exposure: Past   • Smokeless tobacco: Never   Vaping Use   • Vaping Use: Never used   Substance and Sexual Activity   • Alcohol use:  Yes     Alcohol/week: 1.0 standard drink of alcohol     Types: 1 Standard drinks or equivalent per week     Comment: occ   • Drug use: Never   • Sexual activity: Yes     Partners: Male     Birth control/protection: Surgical     Comment: tubal ligation   Other Topics Concern   • None   Social History Narrative   • None     Social Determinants of Health     Financial Resource Strain: Not on file   Food Insecurity: Not on file   Transportation Needs: Not on file   Physical Activity: Not on file   Stress: Not on file   Social Connections: Not on file   Intimate Partner Violence: Not on file   Housing Stability: Not on file       Family History     Family History   Problem Relation Age of Onset   • Heart disease Mother    • COPD Mother    • Other Mother    • Diabetes Mother    • Lung cancer Father    • Testicular cancer Brother        Current Medications       Current Outpatient Medications:   •  B Complex Vitamins (VITAMIN B COMPLEX PO), Take by mouth every other day  , Disp: , Rfl:   •  buPROPion (WELLBUTRIN XL) 300 mg 24 hr tablet, TAKE 1 TABLET BY MOUTH EVERY DAY, Disp: 90 tablet, Rfl: 1  •  clobetasol (TEMOVATE) 0.05 % cream, Apply 5 g (1 application total) topically 2 (two) times a day for 14 days, THEN 5 g (1 application total) 3 (three) times a week., Disp: 60 g, Rfl: 3  •  Collagen-Boron-Hyaluronic Acid (Move Free [employer] [facility]) 40-5-3.3 MG TABS, Take by mouth, Disp: , Rfl:   •  DOCUSATE SODIUM PO, Take 1 each by mouth, Disp: , Rfl:   •  LORazepam (ATIVAN) 1 mg tablet, Take 1 tablet (1 mg total) by mouth daily as needed for anxiety, Disp: 30 tablet, Rfl: 0  •  multivitamin (THERAGRAN) TABS, Take 1 tablet by mouth daily, Disp: , Rfl:   •  vitamin A 2400 MCG (8000 UT) capsule, Take 8,000 Units by mouth daily, Disp: , Rfl:      Allergies     No Known Allergies    Objective     /66   Pulse 82   Temp (!) 97 °F (36.1 °C)   Resp 16   Ht 5' 3" (1.6 m)   Wt 61.5 kg (135 lb 9.6 oz)   SpO2 100%   BMI 24.02 kg/m²      Physical Exam  Constitutional:       General: She is not in acute distress. Appearance: Normal appearance. She is well-developed. She is not diaphoretic. HENT:      Head: Normocephalic and atraumatic. Right Ear: Tympanic membrane, ear canal and external ear normal. There is no impacted cerumen. Left Ear: Tympanic membrane, ear canal and external ear normal. There is no impacted cerumen. Eyes:      General: No scleral icterus. Right eye: No discharge. Left eye: No discharge. Extraocular Movements: Extraocular movements intact. Conjunctiva/sclera: Conjunctivae normal.      Pupils: Pupils are equal, round, and reactive to light. Cardiovascular:      Rate and Rhythm: Normal rate and regular rhythm. Heart sounds: Normal heart sounds. No murmur heard. No friction rub. No gallop. Pulmonary:      Effort: Pulmonary effort is normal. No respiratory distress. Breath sounds: Normal breath sounds. No wheezing or rales. Chest:      Chest wall: No tenderness. Abdominal:      General: Bowel sounds are normal. There is no distension. Palpations: Abdomen is soft. There is no mass. Tenderness: There is no abdominal tenderness. There is no guarding or rebound. Musculoskeletal:         General: No deformity. Normal range of motion. Cervical back: Normal range of motion and neck supple. Skin:     General: Skin is warm and dry. Findings: No erythema or rash. Neurological:      Mental Status: She is alert and oriented to person, place, and time. Psychiatric:         Behavior: Behavior normal.         Thought Content:  Thought content normal.         Judgment: Judgment normal.           Vision Screening    Right eye Left eye Both eyes   Without correction      With correction 20/40 20/50 20/30           MD SHARIFA SavageK[place] DEPT. [facility]

## 2023-08-21 ENCOUNTER — APPOINTMENT (OUTPATIENT)
Dept: LAB | Facility: HOSPITAL | Age: 53
End: 2023-08-21
Attending: FAMILY MEDICINE
Payer: COMMERCIAL

## 2023-08-21 DIAGNOSIS — Z13.89 SCREENING FOR CARDIOVASCULAR, RESPIRATORY, AND GENITOURINARY DISEASES: ICD-10-CM

## 2023-08-21 DIAGNOSIS — Z13.83 SCREENING FOR CARDIOVASCULAR, RESPIRATORY, AND GENITOURINARY DISEASES: ICD-10-CM

## 2023-08-21 DIAGNOSIS — Z90.3 S/P PARTIAL GASTRECTOMY: ICD-10-CM

## 2023-08-21 DIAGNOSIS — Z13.29 SCREENING FOR THYROID DISORDER: ICD-10-CM

## 2023-08-21 DIAGNOSIS — Z13.6 SCREENING FOR CARDIOVASCULAR, RESPIRATORY, AND GENITOURINARY DISEASES: ICD-10-CM

## 2023-08-21 LAB
25(OH)D3 SERPL-MCNC: 36.6 NG/ML (ref 30–100)
ALBUMIN SERPL BCP-MCNC: 4.4 G/DL (ref 3.5–5)
ALP SERPL-CCNC: 87 U/L (ref 34–104)
ALT SERPL W P-5'-P-CCNC: 33 U/L (ref 7–52)
ANION GAP SERPL CALCULATED.3IONS-SCNC: 6 MMOL/L
AST SERPL W P-5'-P-CCNC: 27 U/L (ref 13–39)
BASOPHILS # BLD AUTO: 0.06 THOUSANDS/ÂΜL (ref 0–0.1)
BASOPHILS NFR BLD AUTO: 1 % (ref 0–1)
BILIRUB SERPL-MCNC: 0.52 MG/DL (ref 0.2–1)
BUN SERPL-MCNC: 8 MG/DL (ref 5–25)
CALCIUM SERPL-MCNC: 9.7 MG/DL (ref 8.4–10.2)
CHLORIDE SERPL-SCNC: 105 MMOL/L (ref 96–108)
CHOLEST SERPL-MCNC: 204 MG/DL
CO2 SERPL-SCNC: 28 MMOL/L (ref 21–32)
CREAT SERPL-MCNC: 0.62 MG/DL (ref 0.6–1.3)
EOSINOPHIL # BLD AUTO: 0.14 THOUSAND/ÂΜL (ref 0–0.61)
EOSINOPHIL NFR BLD AUTO: 3 % (ref 0–6)
ERYTHROCYTE [DISTWIDTH] IN BLOOD BY AUTOMATED COUNT: 12.4 % (ref 11.6–15.1)
GFR SERPL CREATININE-BSD FRML MDRD: 104 ML/MIN/1.73SQ M
GLUCOSE P FAST SERPL-MCNC: 80 MG/DL (ref 65–99)
HCT VFR BLD AUTO: 42.4 % (ref 34.8–46.1)
HDLC SERPL-MCNC: 50 MG/DL
HGB BLD-MCNC: 13.9 G/DL (ref 11.5–15.4)
IMM GRANULOCYTES # BLD AUTO: 0.01 THOUSAND/UL (ref 0–0.2)
IMM GRANULOCYTES NFR BLD AUTO: 0 % (ref 0–2)
LDLC SERPL CALC-MCNC: 131 MG/DL (ref 0–100)
LYMPHOCYTES # BLD AUTO: 1.32 THOUSANDS/ÂΜL (ref 0.6–4.47)
LYMPHOCYTES NFR BLD AUTO: 27 % (ref 14–44)
MCH RBC QN AUTO: 28 PG (ref 26.8–34.3)
MCHC RBC AUTO-ENTMCNC: 32.8 G/DL (ref 31.4–37.4)
MCV RBC AUTO: 85 FL (ref 82–98)
MONOCYTES # BLD AUTO: 0.27 THOUSAND/ÂΜL (ref 0.17–1.22)
MONOCYTES NFR BLD AUTO: 6 % (ref 4–12)
NEUTROPHILS # BLD AUTO: 3.1 THOUSANDS/ÂΜL (ref 1.85–7.62)
NEUTS SEG NFR BLD AUTO: 63 % (ref 43–75)
NRBC BLD AUTO-RTO: 0 /100 WBCS
PLATELET # BLD AUTO: 354 THOUSANDS/UL (ref 149–390)
PMV BLD AUTO: 9.8 FL (ref 8.9–12.7)
POTASSIUM SERPL-SCNC: 3.9 MMOL/L (ref 3.5–5.3)
PROT SERPL-MCNC: 7 G/DL (ref 6.4–8.4)
RBC # BLD AUTO: 4.97 MILLION/UL (ref 3.81–5.12)
SODIUM SERPL-SCNC: 139 MMOL/L (ref 135–147)
TRIGL SERPL-MCNC: 113 MG/DL
TSH SERPL DL<=0.05 MIU/L-ACNC: 1.85 UIU/ML (ref 0.45–4.5)
VIT B12 SERPL-MCNC: 425 PG/ML (ref 180–914)
WBC # BLD AUTO: 4.9 THOUSAND/UL (ref 4.31–10.16)

## 2023-08-21 PROCEDURE — 82306 VITAMIN D 25 HYDROXY: CPT

## 2023-08-21 PROCEDURE — 84207 ASSAY OF VITAMIN B-6: CPT

## 2023-08-21 PROCEDURE — 85025 COMPLETE CBC W/AUTO DIFF WBC: CPT

## 2023-08-21 PROCEDURE — 80061 LIPID PANEL: CPT

## 2023-08-21 PROCEDURE — 84443 ASSAY THYROID STIM HORMONE: CPT

## 2023-08-21 PROCEDURE — 36415 COLL VENOUS BLD VENIPUNCTURE: CPT

## 2023-08-21 PROCEDURE — 80053 COMPREHEN METABOLIC PANEL: CPT

## 2023-08-21 PROCEDURE — 84425 ASSAY OF VITAMIN B-1: CPT

## 2023-08-21 PROCEDURE — 82607 VITAMIN B-12: CPT

## 2023-08-21 PROCEDURE — 84590 ASSAY OF VITAMIN A: CPT

## 2023-08-24 LAB
VIT B1 BLD-SCNC: 192.1 NMOL/L (ref 66.5–200)
VIT B6 SERPL-MCNC: 18.4 UG/L (ref 3.4–65.2)

## 2023-08-26 LAB — VIT A SERPL-MCNC: 48.1 UG/DL (ref 20.1–62)

## 2023-08-28 ENCOUNTER — TELEPHONE (OUTPATIENT)
Dept: PSYCHIATRY | Facility: CLINIC | Age: 53
End: 2023-08-28

## (undated) DEVICE — ENDOMETRIAL BX PIPELLE

## (undated) DEVICE — TOWEL SET X-RAY

## (undated) DEVICE — DRAPE,UNDERBUTTOCKS,PCH,STERILE: Brand: MEDLINE

## (undated) DEVICE — TUBING SUCTION 5MM X 12 FT

## (undated) DEVICE — GLOVE SRG BIOGEL 6.5

## (undated) DEVICE — SPONGE 4 X 4 XRAY 16 PLY STRL LF RFD

## (undated) DEVICE — CYSTO TUBING SINGLE IRRIGATION

## (undated) DEVICE — ASTOUND STANDARD SURGICAL GOWN, XL: Brand: CONVERTORS

## (undated) DEVICE — PVC URETHRAL CATHETER: Brand: DOVER

## (undated) DEVICE — PERI/GYN PACK: Brand: CONVERTORS

## (undated) DEVICE — GLOVE INDICATOR PI UNDERGLOVE SZ 7 BLUE

## (undated) DEVICE — TELFA NON-ADHERENT ABSORBENT DRESSING: Brand: TELFA

## (undated) DEVICE — LUBRICANT SURGILUBE TUBE 4 OZ  FLIP TOP

## (undated) DEVICE — TISSUE REMOVAL SYSTEM FLUID MANAGEMENT ACCESSORIES: Brand: SYMPHION